# Patient Record
Sex: MALE | Race: WHITE | NOT HISPANIC OR LATINO
[De-identification: names, ages, dates, MRNs, and addresses within clinical notes are randomized per-mention and may not be internally consistent; named-entity substitution may affect disease eponyms.]

---

## 2017-04-21 ENCOUNTER — RECORDS - HEALTHEAST (OUTPATIENT)
Dept: ADMINISTRATIVE | Facility: OTHER | Age: 72
End: 2017-04-21

## 2017-04-27 ENCOUNTER — RECORDS - HEALTHEAST (OUTPATIENT)
Dept: ADMINISTRATIVE | Facility: OTHER | Age: 72
End: 2017-04-27

## 2017-05-01 ENCOUNTER — RECORDS - HEALTHEAST (OUTPATIENT)
Dept: ADMINISTRATIVE | Facility: OTHER | Age: 72
End: 2017-05-01

## 2017-05-02 ENCOUNTER — AMBULATORY - HEALTHEAST (OUTPATIENT)
Dept: CARDIOLOGY | Facility: CLINIC | Age: 72
End: 2017-05-02

## 2017-05-02 ASSESSMENT — MIFFLIN-ST. JEOR: SCORE: 1610.36

## 2017-05-05 ENCOUNTER — RECORDS - HEALTHEAST (OUTPATIENT)
Dept: ADMINISTRATIVE | Facility: OTHER | Age: 72
End: 2017-05-05

## 2017-05-10 ENCOUNTER — OFFICE VISIT - HEALTHEAST (OUTPATIENT)
Dept: INTERNAL MEDICINE | Facility: CLINIC | Age: 72
End: 2017-05-10

## 2017-05-10 ENCOUNTER — COMMUNICATION - HEALTHEAST (OUTPATIENT)
Dept: FAMILY MEDICINE | Facility: CLINIC | Age: 72
End: 2017-05-10

## 2017-05-10 DIAGNOSIS — N20.0 NEPHROLITHIASIS: ICD-10-CM

## 2017-05-10 DIAGNOSIS — Z01.818 PRE-OP EXAMINATION: ICD-10-CM

## 2017-05-10 LAB
ATRIAL RATE - MUSE: 87 BPM
DIASTOLIC BLOOD PRESSURE - MUSE: NORMAL MMHG
INTERPRETATION ECG - MUSE: NORMAL
P AXIS - MUSE: 69 DEGREES
PR INTERVAL - MUSE: 140 MS
QRS DURATION - MUSE: 92 MS
QT - MUSE: 370 MS
QTC - MUSE: 445 MS
R AXIS - MUSE: 10 DEGREES
SYSTOLIC BLOOD PRESSURE - MUSE: NORMAL MMHG
T AXIS - MUSE: 28 DEGREES
VENTRICULAR RATE- MUSE: 87 BPM

## 2017-05-10 ASSESSMENT — MIFFLIN-ST. JEOR: SCORE: 1601.74

## 2017-05-11 ENCOUNTER — AMBULATORY - HEALTHEAST (OUTPATIENT)
Dept: CARDIOLOGY | Facility: CLINIC | Age: 72
End: 2017-05-11

## 2017-05-11 ENCOUNTER — ANESTHESIA - HEALTHEAST (OUTPATIENT)
Dept: SURGERY | Facility: CLINIC | Age: 72
End: 2017-05-11

## 2017-05-11 ENCOUNTER — SURGERY - HEALTHEAST (OUTPATIENT)
Dept: SURGERY | Facility: CLINIC | Age: 72
End: 2017-05-11

## 2017-05-11 ASSESSMENT — MIFFLIN-ST. JEOR: SCORE: 1583.14

## 2017-05-30 ENCOUNTER — COMMUNICATION - HEALTHEAST (OUTPATIENT)
Dept: CARDIOLOGY | Facility: CLINIC | Age: 72
End: 2017-05-30

## 2017-05-31 ENCOUNTER — RECORDS - HEALTHEAST (OUTPATIENT)
Dept: ADMINISTRATIVE | Facility: OTHER | Age: 72
End: 2017-05-31

## 2017-07-19 ENCOUNTER — AMBULATORY - HEALTHEAST (OUTPATIENT)
Dept: CARDIOLOGY | Facility: CLINIC | Age: 72
End: 2017-07-19

## 2017-07-19 DIAGNOSIS — I10 HTN (HYPERTENSION): ICD-10-CM

## 2017-07-19 DIAGNOSIS — I25.10 CAD (CORONARY ARTERY DISEASE): ICD-10-CM

## 2017-08-09 ENCOUNTER — AMBULATORY - HEALTHEAST (OUTPATIENT)
Dept: CARDIOLOGY | Facility: CLINIC | Age: 72
End: 2017-08-09

## 2017-08-09 ASSESSMENT — MIFFLIN-ST. JEOR: SCORE: 1619.43

## 2017-08-15 ENCOUNTER — RECORDS - HEALTHEAST (OUTPATIENT)
Dept: ADMINISTRATIVE | Facility: OTHER | Age: 72
End: 2017-08-15

## 2017-08-23 ENCOUNTER — AMBULATORY - HEALTHEAST (OUTPATIENT)
Dept: CARDIOLOGY | Facility: CLINIC | Age: 72
End: 2017-08-23

## 2017-08-23 DIAGNOSIS — I25.10 CAD (CORONARY ARTERY DISEASE): ICD-10-CM

## 2017-08-23 DIAGNOSIS — I10 HTN (HYPERTENSION): ICD-10-CM

## 2017-08-23 LAB
CHOLEST SERPL-MCNC: 124 MG/DL
FASTING STATUS PATIENT QL REPORTED: NO
HDLC SERPL-MCNC: 41 MG/DL
LDLC SERPL CALC-MCNC: 66 MG/DL
TRIGL SERPL-MCNC: 87 MG/DL

## 2017-08-25 ENCOUNTER — AMBULATORY - HEALTHEAST (OUTPATIENT)
Dept: CARDIOLOGY | Facility: CLINIC | Age: 72
End: 2017-08-25

## 2017-08-25 ENCOUNTER — RECORDS - HEALTHEAST (OUTPATIENT)
Dept: ADMINISTRATIVE | Facility: OTHER | Age: 72
End: 2017-08-25

## 2017-08-30 ENCOUNTER — OFFICE VISIT - HEALTHEAST (OUTPATIENT)
Dept: CARDIOLOGY | Facility: CLINIC | Age: 72
End: 2017-08-30

## 2017-08-30 DIAGNOSIS — I25.10 CORONARY ARTERY DISEASE DUE TO LIPID RICH PLAQUE: ICD-10-CM

## 2017-08-30 DIAGNOSIS — E78.5 DYSLIPIDEMIA, GOAL LDL BELOW 70: ICD-10-CM

## 2017-08-30 DIAGNOSIS — I25.83 CORONARY ARTERY DISEASE DUE TO LIPID RICH PLAQUE: ICD-10-CM

## 2017-08-30 ASSESSMENT — MIFFLIN-ST. JEOR: SCORE: 1617.61

## 2017-10-16 ENCOUNTER — RECORDS - HEALTHEAST (OUTPATIENT)
Dept: ADMINISTRATIVE | Facility: OTHER | Age: 72
End: 2017-10-16

## 2017-10-18 ENCOUNTER — AMBULATORY - HEALTHEAST (OUTPATIENT)
Dept: CARDIOLOGY | Facility: CLINIC | Age: 72
End: 2017-10-18

## 2017-10-18 ASSESSMENT — MIFFLIN-ST. JEOR: SCORE: 1628.5

## 2017-10-19 ENCOUNTER — RECORDS - HEALTHEAST (OUTPATIENT)
Dept: ADMINISTRATIVE | Facility: OTHER | Age: 72
End: 2017-10-19

## 2017-10-23 ENCOUNTER — RECORDS - HEALTHEAST (OUTPATIENT)
Dept: ADMINISTRATIVE | Facility: OTHER | Age: 72
End: 2017-10-23

## 2018-04-20 ENCOUNTER — RECORDS - HEALTHEAST (OUTPATIENT)
Dept: ADMINISTRATIVE | Facility: OTHER | Age: 73
End: 2018-04-20

## 2018-04-23 ENCOUNTER — RECORDS - HEALTHEAST (OUTPATIENT)
Dept: ADMINISTRATIVE | Facility: OTHER | Age: 73
End: 2018-04-23

## 2018-04-27 ENCOUNTER — RECORDS - HEALTHEAST (OUTPATIENT)
Dept: ADMINISTRATIVE | Facility: OTHER | Age: 73
End: 2018-04-27

## 2018-05-03 ENCOUNTER — AMBULATORY - HEALTHEAST (OUTPATIENT)
Dept: CARDIOLOGY | Facility: CLINIC | Age: 73
End: 2018-05-03

## 2018-05-03 ASSESSMENT — MIFFLIN-ST. JEOR: SCORE: 1637.57

## 2018-05-15 ENCOUNTER — AMBULATORY - HEALTHEAST (OUTPATIENT)
Dept: FAMILY MEDICINE | Facility: CLINIC | Age: 73
End: 2018-05-15

## 2018-05-15 DIAGNOSIS — E11.9 DM II (DIABETES MELLITUS, TYPE II), CONTROLLED (H): ICD-10-CM

## 2018-05-15 DIAGNOSIS — E78.5 DYSLIPIDEMIA, GOAL LDL BELOW 70: ICD-10-CM

## 2018-05-15 DIAGNOSIS — I10 ESSENTIAL HYPERTENSION: ICD-10-CM

## 2018-05-16 ENCOUNTER — AMBULATORY - HEALTHEAST (OUTPATIENT)
Dept: LAB | Facility: CLINIC | Age: 73
End: 2018-05-16

## 2018-05-16 DIAGNOSIS — E11.9 DM II (DIABETES MELLITUS, TYPE II), CONTROLLED (H): ICD-10-CM

## 2018-05-16 DIAGNOSIS — E78.5 DYSLIPIDEMIA, GOAL LDL BELOW 70: ICD-10-CM

## 2018-05-16 DIAGNOSIS — I10 ESSENTIAL HYPERTENSION: ICD-10-CM

## 2018-05-16 LAB
ALBUMIN SERPL-MCNC: 3.7 G/DL (ref 3.5–5)
ALP SERPL-CCNC: 73 U/L (ref 45–120)
ALT SERPL W P-5'-P-CCNC: 18 U/L (ref 0–45)
ANION GAP SERPL CALCULATED.3IONS-SCNC: 10 MMOL/L (ref 5–18)
AST SERPL W P-5'-P-CCNC: 15 U/L (ref 0–40)
BILIRUB SERPL-MCNC: 0.7 MG/DL (ref 0–1)
BUN SERPL-MCNC: 14 MG/DL (ref 8–28)
CALCIUM SERPL-MCNC: 9.4 MG/DL (ref 8.5–10.5)
CHLORIDE BLD-SCNC: 108 MMOL/L (ref 98–107)
CHOLEST SERPL-MCNC: 145 MG/DL
CO2 SERPL-SCNC: 24 MMOL/L (ref 22–31)
CREAT SERPL-MCNC: 0.81 MG/DL (ref 0.7–1.3)
CREAT UR-MCNC: 151 MG/DL
FASTING STATUS PATIENT QL REPORTED: YES
GFR SERPL CREATININE-BSD FRML MDRD: >60 ML/MIN/1.73M2
GLUCOSE BLD-MCNC: 95 MG/DL (ref 70–125)
HBA1C MFR BLD: 5.5 % (ref 3.5–6)
HDLC SERPL-MCNC: 41 MG/DL
LDLC SERPL CALC-MCNC: 83 MG/DL
MICROALBUMIN UR-MCNC: 1.42 MG/DL (ref 0–1.99)
MICROALBUMIN/CREAT UR: 9.4 MG/G
POTASSIUM BLD-SCNC: 4.5 MMOL/L (ref 3.5–5)
PROT SERPL-MCNC: 6.5 G/DL (ref 6–8)
SODIUM SERPL-SCNC: 142 MMOL/L (ref 136–145)
TRIGL SERPL-MCNC: 103 MG/DL

## 2018-05-17 ENCOUNTER — COMMUNICATION - HEALTHEAST (OUTPATIENT)
Dept: FAMILY MEDICINE | Facility: CLINIC | Age: 73
End: 2018-05-17

## 2018-05-17 ENCOUNTER — OFFICE VISIT - HEALTHEAST (OUTPATIENT)
Dept: FAMILY MEDICINE | Facility: CLINIC | Age: 73
End: 2018-05-17

## 2018-05-17 ENCOUNTER — COMMUNICATION - HEALTHEAST (OUTPATIENT)
Dept: CARDIOLOGY | Facility: CLINIC | Age: 73
End: 2018-05-17

## 2018-05-17 DIAGNOSIS — I25.10 CORONARY ARTERY DISEASE DUE TO LIPID RICH PLAQUE: ICD-10-CM

## 2018-05-17 DIAGNOSIS — I25.83 CORONARY ARTERY DISEASE DUE TO LIPID RICH PLAQUE: ICD-10-CM

## 2018-05-17 DIAGNOSIS — E78.5 DYSLIPIDEMIA, GOAL LDL BELOW 70: ICD-10-CM

## 2018-05-17 DIAGNOSIS — Z85.46 H/O PROSTATE CANCER: ICD-10-CM

## 2018-05-17 DIAGNOSIS — E11.9 DM II (DIABETES MELLITUS, TYPE II), CONTROLLED (H): ICD-10-CM

## 2018-05-17 ASSESSMENT — MIFFLIN-ST. JEOR: SCORE: 1613.3

## 2018-05-22 ENCOUNTER — COMMUNICATION - HEALTHEAST (OUTPATIENT)
Dept: FAMILY MEDICINE | Facility: CLINIC | Age: 73
End: 2018-05-22

## 2018-05-24 ENCOUNTER — AMBULATORY - HEALTHEAST (OUTPATIENT)
Dept: CARDIOLOGY | Facility: CLINIC | Age: 73
End: 2018-05-24

## 2018-07-18 ENCOUNTER — AMBULATORY - HEALTHEAST (OUTPATIENT)
Dept: CARDIOLOGY | Facility: CLINIC | Age: 73
End: 2018-07-18

## 2018-07-18 ENCOUNTER — RECORDS - HEALTHEAST (OUTPATIENT)
Dept: ADMINISTRATIVE | Facility: OTHER | Age: 73
End: 2018-07-18

## 2018-07-23 ENCOUNTER — COMMUNICATION - HEALTHEAST (OUTPATIENT)
Dept: CARDIOLOGY | Facility: CLINIC | Age: 73
End: 2018-07-23

## 2018-07-23 ENCOUNTER — OFFICE VISIT - HEALTHEAST (OUTPATIENT)
Dept: CARDIOLOGY | Facility: CLINIC | Age: 73
End: 2018-07-23

## 2018-07-23 DIAGNOSIS — I25.83 CORONARY ARTERY DISEASE DUE TO LIPID RICH PLAQUE: ICD-10-CM

## 2018-07-23 DIAGNOSIS — I25.10 CORONARY ARTERY DISEASE DUE TO LIPID RICH PLAQUE: ICD-10-CM

## 2018-07-23 DIAGNOSIS — E78.5 DYSLIPIDEMIA, GOAL LDL BELOW 70: ICD-10-CM

## 2018-07-23 ASSESSMENT — MIFFLIN-ST. JEOR: SCORE: 1614.89

## 2018-07-24 ENCOUNTER — COMMUNICATION - HEALTHEAST (OUTPATIENT)
Dept: CARDIOLOGY | Facility: CLINIC | Age: 73
End: 2018-07-24

## 2018-07-24 DIAGNOSIS — E78.5 DYSLIPIDEMIA, GOAL LDL BELOW 70: ICD-10-CM

## 2018-08-02 ENCOUNTER — AMBULATORY - HEALTHEAST (OUTPATIENT)
Dept: CARDIOLOGY | Facility: CLINIC | Age: 73
End: 2018-08-02

## 2018-10-01 ENCOUNTER — RECORDS - HEALTHEAST (OUTPATIENT)
Dept: ADMINISTRATIVE | Facility: OTHER | Age: 73
End: 2018-10-01

## 2018-10-04 ENCOUNTER — RECORDS - HEALTHEAST (OUTPATIENT)
Dept: ADMINISTRATIVE | Facility: OTHER | Age: 73
End: 2018-10-04

## 2018-10-05 ENCOUNTER — RECORDS - HEALTHEAST (OUTPATIENT)
Dept: ADMINISTRATIVE | Facility: OTHER | Age: 73
End: 2018-10-05

## 2018-10-10 ENCOUNTER — AMBULATORY - HEALTHEAST (OUTPATIENT)
Dept: CARDIOLOGY | Facility: CLINIC | Age: 73
End: 2018-10-10

## 2018-10-22 ENCOUNTER — AMBULATORY - HEALTHEAST (OUTPATIENT)
Dept: CARDIOLOGY | Facility: CLINIC | Age: 73
End: 2018-10-22

## 2018-11-01 ENCOUNTER — AMBULATORY - HEALTHEAST (OUTPATIENT)
Dept: CARDIOLOGY | Facility: CLINIC | Age: 73
End: 2018-11-01

## 2019-04-03 ENCOUNTER — RECORDS - HEALTHEAST (OUTPATIENT)
Dept: ADMINISTRATIVE | Facility: OTHER | Age: 74
End: 2019-04-03

## 2019-04-25 ENCOUNTER — RECORDS - HEALTHEAST (OUTPATIENT)
Dept: ADMINISTRATIVE | Facility: OTHER | Age: 74
End: 2019-04-25

## 2019-05-10 ENCOUNTER — RECORDS - HEALTHEAST (OUTPATIENT)
Dept: ADMINISTRATIVE | Facility: OTHER | Age: 74
End: 2019-05-10

## 2019-06-18 ENCOUNTER — COMMUNICATION - HEALTHEAST (OUTPATIENT)
Dept: ADMINISTRATIVE | Facility: CLINIC | Age: 74
End: 2019-06-18

## 2019-07-31 ENCOUNTER — OFFICE VISIT - HEALTHEAST (OUTPATIENT)
Dept: FAMILY MEDICINE | Facility: CLINIC | Age: 74
End: 2019-07-31

## 2019-07-31 DIAGNOSIS — F32.9 CURRENT EPISODE OF MAJOR DEPRESSIVE DISORDER WITHOUT PRIOR EPISODE, UNSPECIFIED DEPRESSION EPISODE SEVERITY: ICD-10-CM

## 2019-07-31 DIAGNOSIS — E78.5 DYSLIPIDEMIA, GOAL LDL BELOW 70: ICD-10-CM

## 2019-07-31 DIAGNOSIS — I10 ESSENTIAL HYPERTENSION: ICD-10-CM

## 2019-07-31 DIAGNOSIS — E11.9 DIABETES MELLITUS (H): ICD-10-CM

## 2019-07-31 LAB
CHOLEST SERPL-MCNC: 146 MG/DL
CREAT UR-MCNC: 170 MG/DL
FASTING STATUS PATIENT QL REPORTED: YES
HBA1C MFR BLD: 5.3 % (ref 3.5–6)
HDLC SERPL-MCNC: 48 MG/DL
LDLC SERPL CALC-MCNC: 79 MG/DL
MICROALBUMIN UR-MCNC: 1.46 MG/DL (ref 0–1.99)
MICROALBUMIN/CREAT UR: 8.6 MG/G
TRIGL SERPL-MCNC: 97 MG/DL

## 2019-07-31 RX ORDER — SERTRALINE HYDROCHLORIDE 25 MG/1
25 TABLET, FILM COATED ORAL DAILY
Qty: 30 TABLET | Refills: 2 | Status: SHIPPED
Start: 2019-07-31

## 2019-07-31 ASSESSMENT — MIFFLIN-ST. JEOR: SCORE: 1601.28

## 2019-08-02 ENCOUNTER — COMMUNICATION - HEALTHEAST (OUTPATIENT)
Dept: FAMILY MEDICINE | Facility: CLINIC | Age: 74
End: 2019-08-02

## 2019-09-30 ENCOUNTER — RECORDS - HEALTHEAST (OUTPATIENT)
Dept: ADMINISTRATIVE | Facility: OTHER | Age: 74
End: 2019-09-30

## 2019-10-02 ENCOUNTER — OFFICE VISIT - HEALTHEAST (OUTPATIENT)
Dept: CARDIOLOGY | Facility: CLINIC | Age: 74
End: 2019-10-02

## 2019-10-02 DIAGNOSIS — I25.10 CORONARY ARTERY DISEASE DUE TO LIPID RICH PLAQUE: ICD-10-CM

## 2019-10-02 DIAGNOSIS — I25.83 CORONARY ARTERY DISEASE DUE TO LIPID RICH PLAQUE: ICD-10-CM

## 2019-10-02 DIAGNOSIS — E78.5 DYSLIPIDEMIA, GOAL LDL BELOW 70: ICD-10-CM

## 2019-10-02 ASSESSMENT — MIFFLIN-ST. JEOR: SCORE: 1610.36

## 2019-10-04 ENCOUNTER — RECORDS - HEALTHEAST (OUTPATIENT)
Dept: ADMINISTRATIVE | Facility: OTHER | Age: 74
End: 2019-10-04

## 2019-10-07 ENCOUNTER — COMMUNICATION - HEALTHEAST (OUTPATIENT)
Dept: CARDIOLOGY | Facility: CLINIC | Age: 74
End: 2019-10-07

## 2019-10-07 ENCOUNTER — RECORDS - HEALTHEAST (OUTPATIENT)
Dept: ADMINISTRATIVE | Facility: OTHER | Age: 74
End: 2019-10-07

## 2019-10-07 DIAGNOSIS — E78.5 DYSLIPIDEMIA, GOAL LDL BELOW 70: ICD-10-CM

## 2019-10-09 ENCOUNTER — COMMUNICATION - HEALTHEAST (OUTPATIENT)
Dept: CARDIOLOGY | Facility: CLINIC | Age: 74
End: 2019-10-09

## 2019-10-10 ENCOUNTER — COMMUNICATION - HEALTHEAST (OUTPATIENT)
Dept: CARDIOLOGY | Facility: CLINIC | Age: 74
End: 2019-10-10

## 2019-10-10 DIAGNOSIS — E78.5 DYSLIPIDEMIA, GOAL LDL BELOW 70: ICD-10-CM

## 2019-10-10 RX ORDER — ROSUVASTATIN CALCIUM 40 MG/1
40 TABLET, COATED ORAL DAILY
Qty: 90 TABLET | Refills: 0 | Status: SHIPPED | OUTPATIENT
Start: 2019-10-10

## 2019-12-23 ENCOUNTER — RECORDS - HEALTHEAST (OUTPATIENT)
Dept: ADMINISTRATIVE | Facility: OTHER | Age: 74
End: 2019-12-23

## 2020-01-03 ENCOUNTER — COMMUNICATION - HEALTHEAST (OUTPATIENT)
Dept: FAMILY MEDICINE | Facility: CLINIC | Age: 75
End: 2020-01-03

## 2020-01-09 ENCOUNTER — OFFICE VISIT - HEALTHEAST (OUTPATIENT)
Dept: FAMILY MEDICINE | Facility: CLINIC | Age: 75
End: 2020-01-09

## 2020-01-09 DIAGNOSIS — Z01.818 PRE-OP EXAM: ICD-10-CM

## 2020-01-09 DIAGNOSIS — E78.5 DYSLIPIDEMIA, GOAL LDL BELOW 70: ICD-10-CM

## 2020-01-09 LAB
ANION GAP SERPL CALCULATED.3IONS-SCNC: 11 MMOL/L (ref 5–18)
BASOPHILS # BLD AUTO: 0.1 THOU/UL (ref 0–0.2)
BASOPHILS NFR BLD AUTO: 1 % (ref 0–2)
BUN SERPL-MCNC: 17 MG/DL (ref 8–28)
CALCIUM SERPL-MCNC: 9.8 MG/DL (ref 8.5–10.5)
CHLORIDE BLD-SCNC: 106 MMOL/L (ref 98–107)
CHOLEST SERPL-MCNC: 125 MG/DL
CO2 SERPL-SCNC: 26 MMOL/L (ref 22–31)
CREAT SERPL-MCNC: 1.11 MG/DL (ref 0.7–1.3)
EOSINOPHIL # BLD AUTO: 0.2 THOU/UL (ref 0–0.4)
EOSINOPHIL NFR BLD AUTO: 3 % (ref 0–6)
ERYTHROCYTE [DISTWIDTH] IN BLOOD BY AUTOMATED COUNT: 13 % (ref 11–14.5)
FASTING STATUS PATIENT QL REPORTED: NO
GFR SERPL CREATININE-BSD FRML MDRD: >60 ML/MIN/1.73M2
GLUCOSE BLD-MCNC: 114 MG/DL (ref 70–125)
HCT VFR BLD AUTO: 39 % (ref 40–54)
HDLC SERPL-MCNC: 44 MG/DL
HGB BLD-MCNC: 13.3 G/DL (ref 14–18)
LDLC SERPL CALC-MCNC: 48 MG/DL
LYMPHOCYTES # BLD AUTO: 2 THOU/UL (ref 0.8–4.4)
LYMPHOCYTES NFR BLD AUTO: 30 % (ref 20–40)
MCH RBC QN AUTO: 29 PG (ref 27–34)
MCHC RBC AUTO-ENTMCNC: 34 G/DL (ref 32–36)
MCV RBC AUTO: 85 FL (ref 80–100)
MONOCYTES # BLD AUTO: 0.6 THOU/UL (ref 0–0.9)
MONOCYTES NFR BLD AUTO: 10 % (ref 2–10)
NEUTROPHILS # BLD AUTO: 3.8 THOU/UL (ref 2–7.7)
NEUTROPHILS NFR BLD AUTO: 57 % (ref 50–70)
PLATELET # BLD AUTO: 240 THOU/UL (ref 140–440)
PMV BLD AUTO: 6.7 FL (ref 7–10)
POTASSIUM BLD-SCNC: 4.1 MMOL/L (ref 3.5–5)
RBC # BLD AUTO: 4.58 MILL/UL (ref 4.4–6.2)
SODIUM SERPL-SCNC: 143 MMOL/L (ref 136–145)
TRIGL SERPL-MCNC: 166 MG/DL
WBC: 6.6 THOU/UL (ref 4–11)

## 2020-01-09 ASSESSMENT — MIFFLIN-ST. JEOR: SCORE: 1612.62

## 2020-01-10 ENCOUNTER — COMMUNICATION - HEALTHEAST (OUTPATIENT)
Dept: FAMILY MEDICINE | Facility: CLINIC | Age: 75
End: 2020-01-10

## 2020-01-10 LAB
ATRIAL RATE - MUSE: 67 BPM
DIASTOLIC BLOOD PRESSURE - MUSE: NORMAL
INTERPRETATION ECG - MUSE: NORMAL
P AXIS - MUSE: 69 DEGREES
PR INTERVAL - MUSE: 174 MS
QRS DURATION - MUSE: 92 MS
QT - MUSE: 398 MS
QTC - MUSE: 420 MS
R AXIS - MUSE: 16 DEGREES
SYSTOLIC BLOOD PRESSURE - MUSE: NORMAL
T AXIS - MUSE: 34 DEGREES
VENTRICULAR RATE- MUSE: 67 BPM

## 2020-01-10 ASSESSMENT — MIFFLIN-ST. JEOR: SCORE: 1610.36

## 2020-01-13 ENCOUNTER — SURGERY - HEALTHEAST (OUTPATIENT)
Dept: SURGERY | Facility: HOSPITAL | Age: 75
End: 2020-01-13

## 2020-01-13 ENCOUNTER — ANESTHESIA - HEALTHEAST (OUTPATIENT)
Dept: SURGERY | Facility: HOSPITAL | Age: 75
End: 2020-01-13

## 2020-05-19 ENCOUNTER — COMMUNICATION - HEALTHEAST (OUTPATIENT)
Dept: FAMILY MEDICINE | Facility: CLINIC | Age: 75
End: 2020-05-19

## 2020-07-22 ENCOUNTER — RECORDS - HEALTHEAST (OUTPATIENT)
Dept: ADMINISTRATIVE | Facility: OTHER | Age: 75
End: 2020-07-22

## 2020-08-06 ENCOUNTER — COMMUNICATION - HEALTHEAST (OUTPATIENT)
Dept: CARDIOLOGY | Facility: CLINIC | Age: 75
End: 2020-08-06

## 2020-08-07 ENCOUNTER — OFFICE VISIT - HEALTHEAST (OUTPATIENT)
Dept: CARDIOLOGY | Facility: CLINIC | Age: 75
End: 2020-08-07

## 2020-08-07 DIAGNOSIS — E11.9 DM II (DIABETES MELLITUS, TYPE II), CONTROLLED (H): ICD-10-CM

## 2020-08-07 DIAGNOSIS — I25.10 CORONARY ARTERY DISEASE DUE TO LIPID RICH PLAQUE: ICD-10-CM

## 2020-08-07 DIAGNOSIS — E78.5 DYSLIPIDEMIA, GOAL LDL BELOW 70: ICD-10-CM

## 2020-08-07 DIAGNOSIS — I25.83 CORONARY ARTERY DISEASE DUE TO LIPID RICH PLAQUE: ICD-10-CM

## 2020-08-07 LAB
ALBUMIN SERPL-MCNC: 4 G/DL (ref 3.5–5)
ANION GAP SERPL CALCULATED.3IONS-SCNC: 9 MMOL/L (ref 5–18)
BUN SERPL-MCNC: 17 MG/DL (ref 8–28)
CALCIUM SERPL-MCNC: 9.2 MG/DL (ref 8.5–10.5)
CHLORIDE BLD-SCNC: 109 MMOL/L (ref 98–107)
CHOLEST SERPL-MCNC: 135 MG/DL
CO2 SERPL-SCNC: 26 MMOL/L (ref 22–31)
CREAT SERPL-MCNC: 1.29 MG/DL (ref 0.7–1.3)
FASTING STATUS PATIENT QL REPORTED: YES
GFR SERPL CREATININE-BSD FRML MDRD: 55 ML/MIN/1.73M2
GLUCOSE BLD-MCNC: 99 MG/DL (ref 70–125)
HBA1C MFR BLD: 5.8 %
HDLC SERPL-MCNC: 44 MG/DL
LDLC SERPL CALC-MCNC: 57 MG/DL
PHOSPHATE SERPL-MCNC: 2.6 MG/DL (ref 2.5–4.5)
POTASSIUM BLD-SCNC: 4.6 MMOL/L (ref 3.5–5)
SODIUM SERPL-SCNC: 144 MMOL/L (ref 136–145)
TRIGL SERPL-MCNC: 170 MG/DL

## 2020-08-07 ASSESSMENT — MIFFLIN-ST. JEOR: SCORE: 1619.43

## 2021-05-28 ENCOUNTER — RECORDS - HEALTHEAST (OUTPATIENT)
Dept: ADMINISTRATIVE | Facility: CLINIC | Age: 76
End: 2021-05-28

## 2021-05-30 VITALS — HEIGHT: 71 IN | WEIGHT: 185.1 LBS | BODY MASS INDEX: 25.91 KG/M2

## 2021-05-30 VITALS — BODY MASS INDEX: 25.34 KG/M2 | HEIGHT: 71 IN | WEIGHT: 181 LBS

## 2021-05-30 VITALS — HEIGHT: 71 IN | WEIGHT: 187 LBS | BODY MASS INDEX: 26.18 KG/M2

## 2021-05-31 VITALS — HEIGHT: 71 IN | WEIGHT: 189 LBS | BODY MASS INDEX: 26.46 KG/M2

## 2021-05-31 VITALS — HEIGHT: 71 IN | WEIGHT: 191 LBS | BODY MASS INDEX: 26.74 KG/M2

## 2021-05-31 VITALS — HEIGHT: 71 IN | WEIGHT: 188.6 LBS | BODY MASS INDEX: 26.4 KG/M2

## 2021-05-31 NOTE — PROGRESS NOTES
DIAGNOSIS:  1. Diabetes mellitus (H)  Glycosylated Hemoglobin A1c                            The diagnosis was confirmed.  The condition is stable/controlled on METFORMIN and no side effects  have been noted.  The appropriate follow up labs  ( A1C )have been ordered  (OR ARE UP TO DATE) and medication refills ordered if requested.   Lipid Cascade FASTING    Microalbumin, Random Urine   2. Current episode of major depressive disorder without prior episode, unspecified depression episode severity                         The diagnosis was confirmed.  The condition is stable/controlled on SERTRALINE and no side effects  have been noted.  The appropriate follow up labs  ( NA )have been ordered  (OR ARE UP TO DATE) and medication refills ordered if requested.    sertraline (ZOLOFT) 25 MG tablet   3. Essential hypertension                          The diagnosis was confirmed.  The condition is stable/controlled on LISINOPRIL and no side effects  have been noted.  The appropriate follow up labs  ( UTD )have been ordered  (OR ARE UP TO DATE) and medication refills ordered if requested.      4. Dyslipidemia, goal LDL below 70                          The diagnosis was confirmed.  The condition is stable/controlled on ATORVASTATIN and no side effects  have been noted.  The appropriate follow up labs  ( FLP )have been ordered  (OR ARE UP TO DATE) and medication refills ordered if requested.          PLAN:     Change lisinopril to morning dosing    Check home BP checks    Follow up BP check when you see Dr Smith in Sept 2019            HPI:     In [place] had back problems on 3 April 2019.  If sleep on his left side then gets pain in the LUE.  No weakness or paresthesias in the UE.   [facility]    Saw Urology in May 2019 and PSA was <0.1    BP as low as 117/67 and last night 145/84    No muscle aches on the atorvastatin  No cough on the lisinopril  His wife feels like he is doing better on  "sertraline.      Current Outpatient Medications on File Prior to Visit   Medication Sig Dispense Refill     aspirin 81 MG EC tablet Take 81 mg by mouth daily.       atorvastatin (LIPITOR) 80 MG tablet Take 1 tablet (80 mg total) by mouth daily. 90 tablet 3     FOLIC ACID/MULTIVITS-MIN/LUT (CENTRUM SILVER ORAL) Take 1 tablet by mouth daily.       lisinopril (PRINIVIL,ZESTRIL) 20 MG tablet Take 20 mg by mouth every evening.        metFORMIN (GLUCOPHAGE) 500 MG tablet Take 500 mg by mouth 2 (two) times a day with meals.       nitroglycerin (NITROSTAT) 0.4 MG SL tablet Place 0.4 mg under the tongue every 5 (five) minutes as needed for chest pain.       sildenafil (REVATIO) 20 mg tablet 25 mg daily.  11     [DISCONTINUED] sildenafil (VIAGRA) 100 MG tablet Taking every other day 60 tablet 0     Current Facility-Administered Medications on File Prior to Visit   Medication Dose Route Frequency Provider Last Rate Last Dose     Study Drug Ticagrelor 60 mg/Placebo <THEMIS> tablet 60 mg  1 tablet Oral BID Rebeca Cheng MD         Study Drug Ticagrelor 60 mg/Placebo <THEMIS> tablet 60 mg  1 tablet Oral BID Rebeca Cheng MD         Study Drug Ticagrelor 60 mg/Placebo <THEMIS> tablet 60 mg  1 tablet Oral BID Rebeca Cheng MD         Study Drug Ticagrelor 60 mg/Placebo <THEMIS> tablet 60 mg  1 tablet Oral BID Rebeca Cheng MD         Study Drug Ticagrelor 60 mg/Placebo <THEMIS> tablet 60 mg  1 tablet Oral BID Rebeca Cheng MD         Study Drug Ticagrelor 60 mg/Placebo <THEMIS> tablet 60 mg  1 tablet Oral BID Rebeca Cheng MD         Study Drug Ticagrelor 60 mg/Placebo <THEMIS> tablet 60 mg  1 tablet Oral BID Rebeca Cheng MD           Pmh: reviewed  Psh: reviewed  Allergy:  reviewed      EXAM:    BP (!) 164/97   Pulse 74   Ht 5' 11\" (1.803 m)   Wt 185 lb (83.9 kg)   SpO2 96%   BMI 25.80 kg/m    GEN:   ALERT, NAD, ORIENTED TIMES THREE  LUNGS: CTA  COR: RRR WITHOUT MURMUR  SKIN: NO RASH , " ULCERS OR LESIONS NOTED  EXT: WITHOUT EDEMA/SWELLING  FEET:  MF TESTING: NL              SKIN EXAM:  NL              VASCULAR:   R DP  PULSE: -                                      L DP  PULSE: -                                      R PT  PULSE: +                                      L PT  PULSE: +      Recent Results (from the past 168 hour(s))   Glycosylated Hemoglobin A1c    Collection Time: 07/31/19 10:55 AM   Result Value Ref Range    Hemoglobin A1c 5.3 3.5 - 6.0 %     Lab Results   Component Value Date    CHOL 145 05/16/2018    CHOL 124 08/23/2017    CHOL 160 06/07/2016     Lab Results   Component Value Date    HDL 41 05/16/2018    HDL 41 08/23/2017    HDL 47 06/07/2016     Lab Results   Component Value Date    LDLCALC 83 05/16/2018    LDLCALC 66 08/23/2017    LDLCALC 84 06/07/2016     Lab Results   Component Value Date    TRIG 103 05/16/2018    TRIG 87 08/23/2017    TRIG 147 06/07/2016     Lab Results   Component Value Date    MICROALBUR 1.42 05/16/2018   \    No components found for: CHOLHDL

## 2021-05-31 NOTE — PATIENT INSTRUCTIONS - HE
Change lisinopril to morning dosing    Check home BP checks    Follow up BP check when you see Dr Smith in Sept 2019

## 2021-06-01 VITALS — BODY MASS INDEX: 26.32 KG/M2 | HEIGHT: 71 IN | WEIGHT: 188 LBS

## 2021-06-01 VITALS — BODY MASS INDEX: 26.52 KG/M2 | WEIGHT: 189.4 LBS | HEIGHT: 71 IN

## 2021-06-01 VITALS — WEIGHT: 193 LBS | BODY MASS INDEX: 27.02 KG/M2 | HEIGHT: 71 IN

## 2021-06-01 NOTE — PATIENT INSTRUCTIONS - HE
Aguilar,    It was a pleasure to see you today at the [facility].     Please switch from atorvastatin to rosuvastatin 40 mg orally daily. We will recheck your lipid profile next April.    If the exertional chest discomfort becomes more oppressive, please call us to set up a stress test.    You will see Dr. Smith in one year.     Please call us if you have any questions or concerns about your heart.      Jose Smith M.D.

## 2021-06-02 NOTE — TELEPHONE ENCOUNTER
----- Message from Leanne Richards RN sent at 10/7/2019 11:27 AM CDT -----  Regarding: FW: [facility]    ----- Message -----  From: Jarad Stephenson  Sent: 10/7/2019  10:01 AM CDT  To: Leanne Richards RN  Subject: Refill                                           Patient has already contacted their pharmacy. The medication or refill issue is below:    Primary Cardiologist: Dr Smith    Medication: rosuvastatin (CRESTOR) 40 MG tablet    Issue / Concern: refill - Pt states he needed to [facility] as [facility] for his future orders but for now he wants it at a local [facility]    Preferred Pharmacy: [facility] #[id number] - [address] Phone Number for Patient: home    Additional Info:

## 2021-06-02 NOTE — TELEPHONE ENCOUNTER
Left detailed message for patient informing him that refill nurse sent 90day supply of Crestor to local pharmacy as requested and confirm [facility] for [facility] Smith at 10-2-19 visit.  mg

## 2021-06-03 VITALS
HEART RATE: 72 BPM | BODY MASS INDEX: 26.18 KG/M2 | RESPIRATION RATE: 16 BRPM | WEIGHT: 187 LBS | SYSTOLIC BLOOD PRESSURE: 130 MMHG | DIASTOLIC BLOOD PRESSURE: 76 MMHG | HEIGHT: 71 IN

## 2021-06-03 VITALS — BODY MASS INDEX: 25.9 KG/M2 | HEIGHT: 71 IN | WEIGHT: 185 LBS

## 2021-06-04 VITALS
DIASTOLIC BLOOD PRESSURE: 82 MMHG | WEIGHT: 189 LBS | RESPIRATION RATE: 16 BRPM | BODY MASS INDEX: 26.46 KG/M2 | HEART RATE: 75 BPM | HEIGHT: 71 IN | SYSTOLIC BLOOD PRESSURE: 128 MMHG | OXYGEN SATURATION: 94 %

## 2021-06-04 VITALS — HEIGHT: 71 IN | WEIGHT: 187 LBS | BODY MASS INDEX: 26.18 KG/M2

## 2021-06-04 VITALS
DIASTOLIC BLOOD PRESSURE: 70 MMHG | BODY MASS INDEX: 26.25 KG/M2 | WEIGHT: 187.5 LBS | HEIGHT: 71 IN | SYSTOLIC BLOOD PRESSURE: 140 MMHG

## 2021-06-04 NOTE — TELEPHONE ENCOUNTER
Spoke to pt and let him know that we do not have any openings here today. Offered to check other clinics but pt declined stating that he is [facility] can get [facility]. Pt will call back if he needs to schedule with us.

## 2021-06-04 NOTE — TELEPHONE ENCOUNTER
New Appointment Needed  What is the reason for the visit:    Pre-Op Appt Request  When is the surgery? :  Likely Monday/hasn't been set yet  Where is the surgery?:   [facility]  Who is the surgeon? :  Dr. Purcell  What type of surgery is being done?: Kidney stone  Provider Preference: Any available  How soon do you need to be seen?: As soon as Possible  Waitlist offered?: No  Okay to leave a detailed message:  Yes

## 2021-06-05 NOTE — ANESTHESIA PREPROCEDURE EVALUATION
Anesthesia Evaluation      Patient summary reviewed     Airway   Mallampati: II  Neck ROM: full   Pulmonary - normal exam   (+) sleep apnea on no CPAP, ,                          Cardiovascular - normal exam  Exercise tolerance: > or = 4 METS  (+) hypertension, CAD, CABG/stent, , hypercholesterolemia, PVD     Neuro/Psych - negative ROS     Endo/Other    (+) diabetes mellitus type 2,      GI/Hepatic/Renal - negative ROS   (-) renal disease     Other findings: Pt lost 50 lbs and quit using CPAP, no sleep study repeated but wife says he no longer has the signs she had been observing. Denies MI, had 3 vessel CABG 9/2013.  50-69% stenosis of one carotid by US, plus small aneurysm, but no surgery.  Nephrolithiasis.  Prostate Ca, BCC on back.  Hg 13.3, K 4.1, GFR>60.        Dental - normal exam                        Anesthesia Plan  Planned anesthetic: general LMA    ASA 3   Induction: intravenous   Anesthetic plan and risks discussed with: patient  Anesthesia plan special considerations: antiemetics,   Post-op plan: routine recovery

## 2021-06-05 NOTE — TELEPHONE ENCOUNTER
----- Message from Kelsey Lee PA-C sent at 1/10/2020  6:46 AM CST -----  EKG read as no significant changes and labs are normal, cleared for surgery if cardiology reading is the same as this EKG initial reading. please call results to the patient or send a letter if not reachable by phone.

## 2021-06-05 NOTE — TELEPHONE ENCOUNTER
----- Message from Miky Aguayo MD sent at 1/10/2020 10:48 AM CST -----  Please let pt know that his lipid panel looked good.  ----- Message -----  From: Darrell Smith MD  Sent: 1/10/2020   8:46 AM CST  To: Miky Aguayo MD, MORENO Justice, no new orders.    Miky, I trust that your team will communicate with Aguilar as they shweta this using my order.    Thanks,    Jose

## 2021-06-05 NOTE — ANESTHESIA POSTPROCEDURE EVALUATION
Patient: Aguilar Patterson  CYSTOSCOPY LEFT URETEROSCOPY LASER LITHOTRIPSY STONE BASKET EXTRACTION, LEFT RETROGRADE PYELOGRAM LEFT STENT PLACEMENT  Anesthesia type: general    Patient location: PACU  Last vitals:   Vitals Value Taken Time   /66 1/13/2020  7:40 PM   Temp 36.3  C (97.4  F) 1/13/2020  6:57 PM   Pulse 72 1/13/2020  7:40 PM   Resp 16 1/13/2020  7:40 PM   SpO2 93 % 1/13/2020  7:40 PM     Post vital signs: stable  Level of consciousness: awake and responds to simple questions  Post-anesthesia pain: pain controlled  Post-anesthesia nausea and vomiting: no  Pulmonary: unassisted, return to baseline  Cardiovascular: stable and blood pressure at baseline  Hydration: adequate  Anesthetic events: no    QCDR Measures:  ASA# 11 - Ellen-op Cardiac Arrest: ASA11B - Patient did NOT experience unanticipated cardiac arrest  ASA# 12 - Ellen-op Mortality Rate: ASA12B - Patient did NOT die  ASA# 13 - PACU Re-Intubation Rate: ASA13B - Patient did NOT require a new airway mgmt  ASA# 10 - Composite Anes Safety: ASA10A - No serious adverse event    Additional Notes:

## 2021-06-05 NOTE — PROGRESS NOTES
Preoperative Exam    Scheduled Procedure: Kidney stones  Surgery Date:  1/13/2020  Surgery Location: [facility], fax [phone]    Surgeon:  Dr. Raymond    Assessment/Plan:     1. Pre-op exam    - Electrocardiogram Perform - Clinic  - Basic Metabolic Panel; Future  - HM1(CBC and Differential); Future  - Basic Metabolic Panel  - HM1(CBC and Differential)  - HM1 (CBC with Diff)    2. Kidney stones    3. Dyslipidemia, goal LDL below 70    - Lipid Profile     Surgical Procedure Risk: Intermediate (reported cardiac risk generally 1-5%)  Have you had prior anesthesia?: Yes  Have you or any family members had a previous anesthesia reaction:  No  Do you or any family members have a history of a clotting or bleeding disorder?: No  Cardiac Risk Assessment: increased risk for major cardiac complications based on  myocardial infarction history    Pending review of diagnostic evaluation, APPROVAL GIVEN to proceed with proposed procedure, without further diagnostic evaluation.    Please Note:  if EKG is read as unchanged by Cardiology then he is approved without cardiology consult.        Functional Status: Independent  Patient plans to recover at home with family.     Subjective:      Aguilar Patterson is a 74 y.o. male who presents for a preoperative consultation.    He has a history of triple bypass surgery, myocardial infarction in 2016.  Since that time he has not had chest pain, palpitations, shortness of breath, wheezing, claudication, lower leg edema.  His blood pressure has been well controlled by his current medications and he has recently been increased on his statin medication to achieve a LDL lower than 100.  He has regular visits with his cardiology provider, Dr. Smith.    ROS: Patient denies fever, chills, sweats, fainting, fatigue, weight change, dizziness, sleep problems, chest pain, palpitations, shortness of breath, wheezing, cough,  sore throat, changes in hearing, ear pain,tinnitus,  disphagia,  sore throat, globus, changes in vision, eye pain eye redness, acid reflux, nausea, vomiting, diarrhea, constipation, black or bloody stools,  Dysuria, frequency, urinary incontinence, nocturia, hematuria, back pain,joint pain, bone pain, muscle cramps,edema, weakness, numbness, tingling of extremities, rash, itching, skin changes, swollen lymph nodes, thirst, increased urination, breast lumps, breast pain, nipple discharge, memory difficulties, anxiety, mood swings, (female)vaginal discharge, dyspareunia, menorrhagia, pelvic pain, sexual dysfunction,   (male) testicular lumps, erectile dysfunction.    All other systems reviewed and are negative, other than those listed in the HPI.    Pertinent History  Do you have difficulty breathing or chest pain after walking up a flight of stairs: No  History of obstructive sleep apnea: Yes:   Steroid use in the last 6 months: No  Frequent Aspirin/NSAID use: Yes: Baby asprini 80 mg  Prior Blood Transfusion: Yes: By Pass Surgery   Prior Blood Transfusion Reaction: No  If for some reason prior to, during or after the procedure, if it is medically indicated, would you be willing to have a blood transfusion?:  There is no transfusion refusal.    Current Outpatient Medications   Medication Sig Dispense Refill     aspirin 81 MG EC tablet Take 81 mg by mouth daily.       FOLIC ACID/MULTIVITS-MIN/LUT (CENTRUM SILVER ORAL) Take 1 tablet by mouth daily.       lisinopril (PRINIVIL,ZESTRIL) 20 MG tablet Take 40 mg by mouth every evening. And morning       metFORMIN (GLUCOPHAGE) 500 MG tablet Take 500 mg by mouth 2 (two) times a day with meals.       rosuvastatin (CRESTOR) 40 MG tablet Take 1 tablet (40 mg total) by mouth daily. 90 tablet 0     sertraline (ZOLOFT) 25 MG tablet Take 1 tablet (25 mg total) by mouth daily. 30 tablet 2     sildenafil (REVATIO) 20 mg tablet 25 mg daily.  11     nitroglycerin (NITROSTAT) 0.4 MG SL tablet Place 0.4 mg under the tongue every 5 (five) minutes as  needed for chest pain.       Current Facility-Administered Medications   Medication Dose Route Frequency Provider Last Rate Last Dose     Study Drug Ticagrelor 60 mg/Placebo <THEMIS> tablet 60 mg  1 tablet Oral BID Rebeca Cheng MD         Study Drug Ticagrelor 60 mg/Placebo <THEMIS> tablet 60 mg  1 tablet Oral BID Rebeca Cheng MD         Study Drug Ticagrelor 60 mg/Placebo <THEMIS> tablet 60 mg  1 tablet Oral BID Rebeca Cheng MD         Study Drug Ticagrelor 60 mg/Placebo <THEMIS> tablet 60 mg  1 tablet Oral BID Rebeca Cheng MD         Study Drug Ticagrelor 60 mg/Placebo <THEMIS> tablet 60 mg  1 tablet Oral BID Rebeca Cheng MD         Study Drug Ticagrelor 60 mg/Placebo <THEMIS> tablet 60 mg  1 tablet Oral BID Rebeca Cheng MD            No Known Allergies    Patient Active Problem List   Diagnosis     Dyslipidemia, goal LDL below 70     Essential hypertension     Male Erectile Disorder     Coronary artery disease due to lipid rich plaque     DM II (diabetes mellitus, type II), controlled (H)       Past Medical History:   Diagnosis Date     Basal cell carcinoma of skin 06/10/2016    left back, removed by primary excision     Bladder stone 5/12/2015     Calculus of gallbladder with chronic cholecystitis with obstruction      Carotid Artery Stenosis     Olivier, Miky: 50-69% by US      Colon adenoma 10/8/2015    COLONOSCOPY 10-1-15 DUE IN 5 YEARS      Coronary artery disease due to lipid rich plaque 2013     Dyslipidemia, goal LDL below 70 2013     Essential hypertension      History of transfusion      Kidney stones      Obstructive Sleep Apnea     He lost 50 pounds and does not use CPAP at this time (2016)     Prostate cancer (H)      Supraclinoid carotid artery aneurysm, small      Type 2 Diabetes Mellitus      Umbilical hernia        Past Surgical History:   Procedure Laterality Date     CORONARY ARTERY BYPASS GRAFT  9/302013    LIMA to LAD, and 3 SVG's to DG, OM and RCA      CYSTOSCOPY W/ LASER LITHOTRIPSY Right 5/11/2017    Procedure: CYSTOSCOPY RIGHT URETEROSCOPIC STONE EXTRACTION  WITH LASER LITHOTRIPSY,  RIGHT URETERAL STENT PLACEMENT;  Surgeon: Rohan Guillen MD;  Location: [facility];  Service:      LAPAROSCOPIC CHOLECYSTECTOMY N/A 9/11/2016    Procedure: CHOLECYSTECTOMY LAPAROSCOPIC WITH CHOLANGIOGRAMS;  Surgeon: Efraín Johnson MD;  Location: [facility];  Service:      IN LAP,PROSTATECTOMY,RADICAL,W/NERVE SPARE,INCL ROBOTIC Bilateral 10/18/2016    Procedure: DAVINCI RADICAL RETROPUBIC PROSTATECTOMY BILATERAL PELVIC LYMPH NODE DISSECTION, REPAIR UMBILICAL HERNIA, REMOVE BLADDER CALCULUS LYSIS OF ADHESIONS;  Surgeon: Ramon Purcell MD;  Location: [facility];  Service: Urology       Social History     Socioeconomic History     Marital status:      Spouse name: Renetta     Number of children: 1     Years of education: Not on file     Highest education level: Not on file   Occupational History     Occupation:      Employer: RETIRED   Social Needs     Financial resource strain: Not on file     Food insecurity:     Worry: Not on file     Inability: Not on file     Transportation needs:     Medical: Not on file     Non-medical: Not on file   Tobacco Use     Smoking status: Never Smoker     Smokeless tobacco: Never Used   Substance and Sexual Activity     Alcohol use: No     Drug use: No     Sexual activity: Yes     Partners: Female     Birth control/protection: Post-menopausal   Lifestyle     Physical activity:     Days per week: 3 days     Minutes per session: 30 min     Stress: Not on file   Relationships     Social connections:     Talks on phone: Not on file     Gets together: Not on file     Attends Yazidi service: Not on file     Active member of club or organization: Not on file     Attends meetings of clubs or organizations: Not on file     Relationship status: Not on file     Intimate partner violence:     Fear of  "current or ex partner: Not on file     Emotionally abused: Not on file     Physically abused: Not on file     Forced sexual activity: Not on file   Other Topics Concern     Not on file   Social History Narrative    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 He gets his medications from the [facility] and sees a nurse practitioner at their [facility]. 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  He then was rehabilitated at [facility] in [place], [place].  Viktor and his wife spend 6 months each winter in [place], [place], which is a [facility] adjacent to [place].  They have 1 son and do not have any grandchildren.  Their son is a  in the [employer].  Viktor told me in 2019 that he and his wife are contemplating moving to [place] full-time.       Patient Care Team:  Miky Aguayo MD as PCP - General  Miky Aguayo MD as Assigned PCP          Objective:     Vitals:    01/09/20 1747   BP: 150/80   Weight: 187 lb 8 oz (85 kg)   Height: 5' 11\" (1.803 m)         Physical Exam:  Alert, cooperative, well-hydrated.  Appears well.  Eyes: Pupils equal, round, reactive to light.  HEENT: Sclera white, nares patent, MMM   Lungs: Clear to auscultation. No retractions, no increased work of respiration, equal chest rise.   Heart: Regular rate and rhythm, no murmurs, clicks,    Gallops.  Abdomen: Soft, bowel sounds in 4 quadrants with no tenderness to palpation, no organomegaly or masses, no aortic or renal bruits.  Extremities: no tenderness to palpation of gastrocnemius, bilaterally.  Skin: no increased warmth, edema, or erythema of lower legs bilaterally.  Back:  No cervical, thoracic or lumbar tenderness to spinous processes or musculature.    Neuro: pupils equal and reactive to light bilaterally, CN II - XII grossly intact. No focal motor/sensory deficits.      There are no Patient Instructions on file for this " visit.  Electrocardiogram Perform - Clinic   Order: [id number]   Status:  In process   Visible to patient:  No (Not Released)   Next appt:  None   Dx:  Pre-op exam     Ref Range & Units 1/9/20 1825 5/10/17 1559    SYSTOLIC BLOOD PRESSURE       DIASTOLIC BLOOD PRESSURE       VENTRICULAR RATE BPM 67  87     ATRIAL RATE BPM 67  87     P-R INTERVAL ms 174  140     QRS DURATION ms 92  92     Q-T INTERVAL ms 398  370     QTC CALCULATION (BEZET) ms 420  445     P Axis degrees 69  69     R AXIS degrees 16  10     T AXIS degrees 34  28     MUSE DIAGNOSIS  Normal sinus rhythm   Possible Left atrial enlargement   Nonspecific ST abnormality   Abnormal ECG   When compared with ECG of 10-MAY-2017 15:59,   No significant change was found  Normal sinus rhythm   Left atrial enlargement   Nonspecific ST abnormality   Abnormal ECG   When compared with ECG of 10-SEP-2016 22:17,   ST now depressed in Inferior leads   Confirmed by SEBASTIAN FOSTER, HEATH LOC:[facility] ([id number]) on 5/10/2017 4:27:18 PM    Resulting Agency  [facility]         Specimen Collected: 01/09/20 18:25   Last Resulted: 01/09/20 18:27   Lab Flowsheet Order Details View Encounter Lab and Collection Details Routing Result History         Linked Documents      View Image         Other Results from 1/9/2020     Contains abnormal data Lipid Profile   Order: [id number]     Status:  Final result   Visible to patient:  Yes (MyChart)   Next appt:  None   Dx:  Dyslipidemia, goal LDL below 70     Ref Range & Units 1/9/20 1845    Triglycerides <=149 mg/dL 166High      Cholesterol <=199 mg/dL 125     LDL Calculated <=129 mg/dL 48     HDL Cholesterol >=40 mg/dL 44     Patient Fasting > 8hrs?  No    Resulting Agency  [facility]         Specimen Collected: 01/09/20 18:45   Last Resulted: 01/09/20 22:41   Lab Flowsheet Order Details View Encounter Lab and Collection Details Routing Result History               Labs:  Electrocardiogram Perform - Clinic   Order: [id number]   Status:  Final result   Visible to  patient:  No (Not Released)   Next appt:  None   Dx:  Pre-op exam     Ref Range & Units 1/9/20 1825 5/10/17 1559    SYSTOLIC BLOOD PRESSURE       DIASTOLIC BLOOD PRESSURE       VENTRICULAR RATE BPM 67  87     ATRIAL RATE BPM 67  87     P-R INTERVAL ms 174  140     QRS DURATION ms 92  92     Q-T INTERVAL ms 398  370     QTC CALCULATION (BEZET) ms 420  445     P Axis degrees 69  69     R AXIS degrees 16  10     T AXIS degrees 34  28     MUSE DIAGNOSIS  Sinus rhythm   Possible Left atrial enlargement   Minimal criteria for Nonspecific ST abnormality   Abnormal ECG   When compared with ECG of 10-MAY-2017 15:59,   No significant change was found   Confirmed by KEVIN FUNES MD LOC:[facility] ([id number]) on 1/10/2020 11:36:19 AM              Recent Results (from the past 48 hour(s))   Electrocardiogram Perform - Clinic    Collection Time: 01/09/20  6:25 PM   Result Value Ref Range    SYSTOLIC BLOOD PRESSURE      DIASTOLIC BLOOD PRESSURE      VENTRICULAR RATE 67 BPM    ATRIAL RATE 67 BPM    P-R INTERVAL 174 ms    QRS DURATION 92 ms    Q-T INTERVAL 398 ms    QTC CALCULATION (BEZET) 420 ms    P Axis 69 degrees    R AXIS 16 degrees    T AXIS 34 degrees    MUSE DIAGNOSIS       Normal sinus rhythm  Possible Left atrial enlargement  Nonspecific ST abnormality  Abnormal ECG  When compared with ECG of 10-MAY-2017 15:59,  No significant change was found     Lipid Profile    Collection Time: 01/09/20  6:45 PM   Result Value Ref Range    Triglycerides 166 (H) <=149 mg/dL    Cholesterol 125 <=199 mg/dL    LDL Calculated 48 <=129 mg/dL    HDL Cholesterol 44 >=40 mg/dL    Patient Fasting > 8hrs? No    Basic Metabolic Panel    Collection Time: 01/09/20  6:45 PM   Result Value Ref Range    Sodium 143 136 - 145 mmol/L    Potassium 4.1 3.5 - 5.0 mmol/L    Chloride 106 98 - 107 mmol/L    CO2 26 22 - 31 mmol/L    Anion Gap, Calculation 11 5 - 18 mmol/L    Glucose 114 70 - 125 mg/dL    Calcium 9.8 8.5 - 10.5 mg/dL    BUN 17 8 - 28 mg/dL    Creatinine  1.11 0.70 - 1.30 mg/dL    GFR MDRD Af Amer >60 >60 mL/min/1.73m2    GFR MDRD Non Af Amer >60 >60 mL/min/1.73m2   HM1 (CBC with Diff)    Collection Time: 01/09/20  6:45 PM   Result Value Ref Range    WBC 6.6 4.0 - 11.0 thou/uL    RBC 4.58 4.40 - 6.20 mill/uL    Hemoglobin 13.3 (L) 14.0 - 18.0 g/dL    Hematocrit 39.0 (L) 40.0 - 54.0 %    MCV 85 80 - 100 fL    MCH 29.0 27.0 - 34.0 pg    MCHC 34.0 32.0 - 36.0 g/dL    RDW 13.0 11.0 - 14.5 %    Platelets 240 140 - 440 thou/uL    MPV 6.7 (L) 7.0 - 10.0 fL    Neutrophils % 57 50 - 70 %    Lymphocytes % 30 20 - 40 %    Monocytes % 10 2 - 10 %    Eosinophils % 3 0 - 6 %    Basophils % 1 0 - 2 %    Neutrophils Absolute 3.8 2.0 - 7.7 thou/uL    Lymphocytes Absolute 2.0 0.8 - 4.4 thou/uL    Monocytes Absolute 0.6 0.0 - 0.9 thou/uL    Eosinophils Absolute 0.2 0.0 - 0.4 thou/uL    Basophils Absolute 0.1 0.0 - 0.2 thou/uL        Immunization History   Administered Date(s) Administered     Hep A, historic 07/27/2007, 02/13/2008     Influenza X9u8-22, 12/21/2009     Influenza high dose,seasonal,PF, 65+ yrs 10/11/2016     Influenza, inj, historic,unspecified 12/21/2007, 10/28/2008, 10/13/2010, 09/14/2011     Influenza, seasonal,quad inj 6-35 mos 10/25/2012     Pneumo Conj 13-V (2010&after) 11/23/2015     Td,adult,historic,unspecified 12/01/1997     Tdap 11/23/2015     ZOSTER, LIVE 12/02/2014           Electronically signed by Kelsey Lee PA-C 01/09/20 5:48 PM

## 2021-06-05 NOTE — PROGRESS NOTES
EKG read as no significant changes and labs are normal, cleared for surgery if cardiology reading is the same as this EKG initial reading. please call results to the patient or send a letter if not reachable by phone.

## 2021-06-05 NOTE — ANESTHESIA CARE TRANSFER NOTE
Last vitals:   Vitals:    01/13/20 1857   BP: 142/64   Pulse: 74   Resp: 16   Temp: 36.3  C (97.4  F)   SpO2: 100%     Patient's level of consciousness is drowsy  Spontaneous respirations: yes  Maintains airway independently: yes  Dentition unchanged: yes  Oropharynx: oropharynx clear of all foreign objects    QCDR Measures:  ASA# 20 - Surgical Safety Checklist: WHO surgical safety checklist completed prior to induction    PQRS# 430 - Adult PONV Prevention: 4558F - Pt received => 2 anti-emetic agents (different classes) preop & intraop  ASA# 8 - Peds PONV Prevention: NA - Not pediatric patient, not GA or 2 or more risk factors NOT present  PQRS# 424 - Ellen-op Temp Management: 4559F - At least one body temp DOCUMENTED => 35.5C or 95.9F within required timeframe  PQRS# 426 - PACU Transfer Protocol: - Transfer of care checklist used  ASA# 14 - Acute Post-op Pain: ASA14B - Patient did NOT experience pain >= 7 out of 10

## 2021-06-10 NOTE — ANESTHESIA POSTPROCEDURE EVALUATION
Patient: Aguilar Patterson  CYSTOSCOPY RIGHT URETEROSCOPIC STONE EXTRACTION  WITH LASER LITHOTRIPSY,  RIGHT URETERAL STENT PLACEMENT  Anesthesia type: general    Patient location: PACU  Last vitals:   Vitals:    05/11/17 1400   BP: 138/63   Pulse: 86   Resp: 17   Temp:    SpO2: 93%     Post vital signs: stable  Level of consciousness: awake and responds to simple questions  Post-anesthesia pain: pain controlled  Post-anesthesia nausea and vomiting: no  Pulmonary: unassisted, return to baseline  Cardiovascular: stable and blood pressure at baseline  Hydration: adequate  Anesthetic events: no    QCDR Measures:  ASA# 11 - Ellen-op Cardiac Arrest: ASA11B - Patient did NOT experience unanticipated cardiac arrest  ASA# 12 - Ellen-op Mortality Rate: ASA12B - Patient did NOT die  ASA# 13 - PACU Re-Intubation Rate: ASA13B - Patient did NOT require a new airway mgmt  ASA# 10 - Composite Anes Safety: ASA10A - No serious adverse event  ASA# 38 - New Corneal Injury: ASA38A - No new exposure keratitis or corneal abrasion in PACU    Additional Notes:

## 2021-06-10 NOTE — ANESTHESIA CARE TRANSFER NOTE
Last vitals:   Vitals:    05/11/17 1315   BP: 140/70   Pulse: 97   Resp: 12   Temp: 37.3  C (99.1  F)   SpO2: 100%     Patient's level of consciousness is drowsy  Spontaneous respirations: yes  Maintains airway independently: yes  Dentition unchanged: yes  Oropharynx: oropharynx clear of all foreign objects    QCDR Measures:  ASA# 20 - Surgical Safety Checklist: ASA20A - Safety Checks Done  PQRS# 430 - Adult PONV Prevention: 4558F - Pt received => 2 anti-emetic agents (different classes) preop & intraop  ASA# 8 - Peds PONV Prevention: NA - Not pediatric patient, not GA or 2 or more risk factors NOT present  PQRS# 424 - Ellen-op Temp Management: 4559F - At least one body temp DOCUMENTED => 35.5C or 95.9F within required timeframe  PQRS# 426 - PACU Transfer Protocol: - Transfer of care checklist used  ASA# 14 - Acute Post-op Pain: ASA14B - Patient did NOT experience pain >= 7 out of 10    I completed my SBAR handoff to the receiving nurse per policy and procedure.

## 2021-06-10 NOTE — PROGRESS NOTES
"Preoperative H&P    Surgeon: Dr Guillen   Date Of Surgery: 5/11/17  Procedure: CYSTOSCOPY RIGHT URETEROSCOPY WITH LASER LITHOTRIPSY RIGHT URETERAL STENT PLACEMENT    History of Present Illness:  Aguilar Patterson is a 71 y.o.  male who presents to the office today for a preoperative consultation at the request of Dr. Guillen for ureteroscopy with lithotripsy and right ureteral stent placement. H/o kidney stones and hydronephrosis? Dr. Guillen wants to do this sooner than later. He was called earlier and said \"how about surgery tomorrow\".     There is no history of anesthesia issues in the past. Denies history or recent abnormal bleeding     Review of Systems:   CV: Chest pain- not noted. Shortness of breath- not noted. Edema- not noted. BARRETO- not noted. Orthopnea- not noted.   GI: Abdominal pain- not noted. Nausea\vomiting\diarrhea-not noted. Melena or hematochezia- not noted.   : Urgency-not noted. Frequency- not noted. Dysuria- not noted. Hematuria- not noted. Incontinence- not noted.   CNS: Headaches- no significant symptoms. Dizziness- not noted. Visual Changes- not noted.     Physical Examination:    /82  Pulse 88  Ht 5' 11\" (1.803 m)  Wt 185 lb 1.6 oz (84 kg)  BMI 25.82 kg/m2  Wt Readings from Last 3 Encounters:   05/10/17 185 lb 1.6 oz (84 kg)   05/02/17 187 lb (84.8 kg)   10/25/16 174 lb (78.9 kg)     Body mass index is 25.82 kg/(m^2). (>25?)    GEN: alert, cooperative, no acute distress  ENT: External Canals: within normal limits.   Oropharynx: moist, no lesions, clear. Eyes: Conjunctiva: normal.   Neck: Supple, no adenopathy, no abnormal masses.   Chest: Within normal limits.  Cardiac: RRR, no rubs, no gallops.  Lungs: Breath Sounds normal, no crackles, no wheezing, no rhonchi.   Abd: soft, bowel sounds normal, no organomegaly, no tenderness.  Extr: Warm, no edema.   Skin: No rashes    Outpatient Encounter Prescriptions as of 5/10/2017   Medication Sig Dispense Refill     aspirin 81 MG EC tablet " Take 81 mg by mouth daily.       atorvastatin (LIPITOR) 40 MG tablet Take 40 mg by mouth at bedtime.       FOLIC ACID/MULTIVITS-MIN/LUT (CENTRUM SILVER ORAL) Take 1 tablet by mouth daily.       lisinopril (PRINIVIL,ZESTRIL) 20 MG tablet Take 20 mg by mouth every evening.        metFORMIN (GLUCOPHAGE) 500 MG tablet Take 500 mg by mouth 2 (two) times a day with meals.       nitroglycerin (NITROSTAT) 0.4 MG SL tablet Place 0.4 mg under the tongue every 5 (five) minutes as needed for chest pain.       pravastatin (PRAVACHOL) 80 MG tablet Take 80 mg by mouth bedtime.       Facility-Administered Encounter Medications as of 5/10/2017   Medication Dose Route Frequency Provider Last Rate Last Dose     Study Drug Ticagrelor 60 mg/Placebo <THEMIS> tablet 60 mg  1 tablet Oral BID Rebeca Cheng MD         Study Drug Ticagrelor 60 mg/Placebo <THEMIS> tablet 60 mg  1 tablet Oral BID Rebeca Cheng MD         Study Drug Ticagrelor 60 mg/Placebo <THEMIS> tablet 60 mg  1 tablet Oral BID Rebeca Cheng MD         Past Medical History:   Diagnosis Date     Basal cell carcinoma of skin 06/10/2016    left back, removed by primary excision     Bladder stone 5/12/2015     Carotid Artery Stenosis     Created by Ebook Glue Annotation: Nov 11 2013 12:07PM Miky Marshall: 50-69% by US      Colon adenoma 10/8/2015    COLONOSCOPY 10-1-15 DUE IN 5 YEARS      Coronary artery disease due to lipid rich plaque 2013     Dyslipidemia, goal LDL below 70 2013     Essential hypertension      Gall stones      History of transfusion      Hypercholesteremia      Kidney stones      Malignant Neoplasm Of The Prostate Gland 2013    Created by Ebook Glue Annotation: Feb 12 2013  1:26PM Pravin Aguayo Miky: DX: FEB 2013      Obstructive Sleep Apnea     He lost 50 pounds and does not use CPAP at this time (2016)     Prostate cancer      Supraclinoid carotid artery aneurysm, small     in abdomen     Type 2 Diabetes Mellitus       Umbilical hernia      Past Surgical History:   Procedure Laterality Date     CARDIAC SURGERY  2013    CABG3     CORONARY ARTERY BYPASS GRAFT  9/302013    LIMA to LAD, and 3 SVG's to DG, OM and RCA     LAPAROSCOPIC CHOLECYSTECTOMY N/A 9/11/2016    Procedure: CHOLECYSTECTOMY LAPAROSCOPIC WITH CHOLANGIOGRAMS;  Surgeon: Efraín Johnson MD;  Location: [facility];  Service:      NC LAP,PROSTATECTOMY,RADICAL,W/NERVE SPARE,INCL ROBOTIC Bilateral 10/18/2016    Procedure: DAVINCI RADICAL RETROPUBIC PROSTATECTOMY BILATERAL PELVIC LYMPH NODE DISSECTION, REPAIR UMBILICAL HERNIA, REMOVE BLADDER CALCULUS LYSIS OF ADHESIONS;  Surgeon: Ramon Purcell MD;  Location: [facility];  Service: Urology     PROSTATECTOMY  10/18/2016    Hernia and badder stone removal     Social History   Substance Use Topics     Smoking status: Never Smoker     Smokeless tobacco: Never Used     Alcohol use No     No Known Allergies    Diagnoses:     Encounter Diagnoses   Name Primary?     Pre-op examination Yes     Nephrolithiasis       1. Pre-op examination for lithotripsy due nephrolithiasis    Discussion-Plan:     Labs: none needed    EKG: NSR, no significant issues noted.     -Prophylaxis for cardiac events with perioperative beta-blockers: clinical risk factors not indicated.  -Avoidance of; Aspirin, Ibuprofen, Naproxen, and other NSAIDS prior to surgery. To call with any changes in present status  -can take daily meds with a sip of water morning of surgery     Cardiac Risk Estimation: RCRI for planned surgery is < 1%    Clinical Risk Factors:     -Cardiac: h/o CABG x 4, Blood pressure normally well controlled on hypertensives.      -Metabolic/Endocrine: CKD stage 3 or above. History of DM well controlled.    -Neurologic: no hx of CVA    Functional Capacity:     > 4 mets: able to climb stairs and walk 1-2 blocks w/o stopping    Presently Clinically Stable for Scheduled Surgery. No contraindications to planned surgery and  difficulty    Jd Castañeda MD

## 2021-06-10 NOTE — ANESTHESIA PREPROCEDURE EVALUATION
Anesthesia Evaluation      Patient summary reviewed   No history of anesthetic complications     Airway   Mallampati: II  Neck ROM: full   Pulmonary - normal exam    breath sounds clear to auscultation  (+) sleep apnea on no CPAP, ,   (-) shortness of breath, not a smoker                         Cardiovascular   Exercise tolerance: > or = 4 METS  (+) hypertension, CAD, CABG/stent, , hypercholesterolemia,     (-) angina, murmur  ECG reviewed  Rhythm: regular  Rate: normal,    no murmur      Neuro/Psych - negative ROS     Comments: JACOB and aneurysm  ED    Endo/Other    (+) diabetes mellitus type 2,      GI/Hepatic/Renal    (+)   chronic renal disease,     Comments: Malignant neoplasm of the prostate          Dental - normal exam                        Anesthesia Plan  Planned anesthetic: general LMA    ASA 3     Anesthetic plan and risks discussed with: patient  Anesthesia plan special considerations: antiemetics,   Post-op plan: routine recovery

## 2021-06-10 NOTE — PROGRESS NOTES
Subject seen for Visit 14THEMIS study (evaluate the effect of Ticagrelor 60 mg BID on incidence of CV death, MI or stroke in patients with Type 2 Diabetes Mellitus). Adverse events of interest and endpoints assessed. EQ-5D completed. Study drug returned ([id number]- he states that he has 14 pills left at home in the pill box) and new supply dispensed (Lot # [id number] and [id number]). Educated regarding medication compliance, diet and lifestyle. Will see him again in August. Pt states that he has had problem with incontinence since his Prostectomy last oct, 2016. He also states that he has had problems with blood in his urine since Jan. ,2017.    Start date: Jan, 2017 -  Date resolved: ongoing   Intensity: (mild ) Pt states that is has happened 3 times in the past 3 month and last for 5-7 days.  He saw Dr. Purcell( who knows he is doing the research study) and has a CT scan scheduled for 05/05/2017.   Study Medication adjustment: No  Outcome: (resolving)  Was treatment given for this event: Pending  Serious adverse event?  No   Special interest event? No  Endpoint? No   Fatal event? No  Relationship to trial treatment? Yes, suspect anti platelet agent could be causing hematuria.  Dr. Cheng: Please assess.  Lor Garner RN

## 2021-06-10 NOTE — TELEPHONE ENCOUNTER
Wellness Screening Tool  Symptom Screening:  Do you have one of the following NEW symptoms:    Fever (subjective or >100.0)?  No    A new cough?  No    Shortness of breath?  No     Chills? No     New loss of taste or smell? No     Generalized body aches? No     New persistent headache? No     New sore throat? No     Nausea, vomiting, or diarrhea?  No    Within the past 3 weeks, have you been exposed to someone with a known positive illness below:    COVID-19 (known or suspected)?  No    Chicken pox?  No    Mealses?  No    Pertussis?  No    Patient notified of visitor policy- They may have one person accompany them to their appointment, but they will need to wear a mask and will be screened upon arrival for symptoms: Yes  Pt informed to wear a mask: Yes  Pt notified if they develop any symptoms listed above, prior to their appointment, they are to call the clinic directly at [phone] for further instructions.  Yes  Patient's appointment status: Patient will be seen in clinic as scheduled on Fri 8-7-20 @ 0910 in  clinic with Dr. Smith.  mg

## 2021-06-10 NOTE — PATIENT INSTRUCTIONS - HE
Aguilar,    It was a pleasure to see you today at [facility].    We will post your lab results to [facility].    Best wishes with your move to [place].    Please call us if you have any questions or concerns about your heart.      Jose Smith M.D.  [facility]

## 2021-06-12 NOTE — PROGRESS NOTES
Themis research study:  .Subject seen for Visit 16, Month 36 THEMIS study (evaluate the effect of Ticagrelor 60 mg BID on incidence of CV death, MI or stroke in patients with Type 2 Diabetes Mellitus). Adverse events of interest and endpoints [coronary events/procedures, cva, vascular interventions, bleeding, hospiatlization] assessed and, if present, noted below. Medications [including ASA and ADP] reviewed and changes noted below.  EQ-5D completed. Study drug returned and new study drug was dispensed per the protocol. Pt states that he will be leaving 10/24/2017 for [place] and will be returning approx 05/01/2018. Will notify the monitor that the pt will be coming in early for study medication so he has enough for the time period that he is gone. Pt is still having problems with incontinence since the Proctectomy. He has tried Bio feed back with limited success. Educated regarding medication compliance, diet and lifestyle. Will see him again in October, 2017    Current Outpatient Prescriptions:      aspirin 81 MG EC tablet, Take 81 mg by mouth daily., Disp: , Rfl:      atorvastatin (LIPITOR) 40 MG tablet, Take 40 mg by mouth at bedtime., Disp: , Rfl:      FOLIC ACID/MULTIVITS-MIN/LUT (CENTRUM SILVER ORAL), Take 1 tablet by mouth daily., Disp: , Rfl:      lisinopril (PRINIVIL,ZESTRIL) 20 MG tablet, Take 20 mg by mouth every evening. , Disp: , Rfl:      metFORMIN (GLUCOPHAGE) 500 MG tablet, Take 500 mg by mouth 2 (two) times a day with meals., Disp: , Rfl:      nitroglycerin (NITROSTAT) 0.4 MG SL tablet, Place 0.4 mg under the tongue every 5 (five) minutes as needed for chest pain., Disp: , Rfl:      pravastatin (PRAVACHOL) 80 MG tablet, Take 80 mg by mouth bedtime., Disp: , Rfl:      sildenafil (VIAGRA) 100 MG tablet, Taking every other day, Disp: 60 tablet, Rfl: 0    Current Facility-Administered Medications:      Study Drug Ticagrelor 60 mg/Placebo <THEMIS> tablet 60 mg, 1 tablet, Oral, BID, Rebeca Cheng,  MD     Study Drug Ticagrelor 60 mg/Placebo <THEMIS> tablet 60 mg, 1 tablet, Oral, BID, Rebeca Cheng MD     Study Drug Ticagrelor 60 mg/Placebo <THEMIS> tablet 60 mg, 1 tablet, Oral, BID, Rebeca Cheng MD     Study Drug Ticagrelor 60 mg/Placebo <THEMIS> tablet 60 mg, 1 tablet, Oral, BID, Rebeca Cheng MD     1.Diagnosis/event description:  Bruising( pt states that he notices that he bruises easy and more thatn usual for the past yr. This is the first time that he has talked about this.  Start date: 07/01/2016- no bruising noted today.   Date resolved: ongoing   Intensity: (mild)  Study Medication adjustment: None  Outcome: (resolving)  Was treatment given for this event: No  Serious adverse event?  No   Special interest event? NO  Endpoint? No   Fatal event? No  Relationship to trial treatment? Yes.   Please assess. Pt wants to know if he should continue study med.    2.Diagnosis   Right shoulder pain( pt fell on a step)  Start date: 05/01/2017   Date resolved: Resolveing   Intensity: (mild)  Study Medication adjustment: No  Outcome: (resolving  Was treatment given for this event: Aleve am and pm with good relief.  Serious adverse event?  No   Special interest event? No  Endpoint? No   Fatal event? NO  Relationship to trial treatment?  No  Dr. Cheng Please assess.  Lor Garner RN

## 2021-06-13 NOTE — PROGRESS NOTES
Subject seen for Visit Unscheduled.THEMIS study (evaluate the effect of Ticagrelor 60 mg BID on incidence of CV death, MI or stroke in patients with Type 2 Diabetes Mellitus). Adverse events of interest and endpoints [coronary events/procedures, cva, vascular interventions, bleeding, hospiatlization] assessed . Pt did fall in the bathtub and injured his left arm in May, 2017. He takes Aleve for pain with good recovery. This will not be reported as an A/E as it does not meet the Themis guidelines. Medications [including ASA and ADP] reviewed and changes noted below.  EQ-5D completed. Study drug returned ([id number]  tabs of lot # [id number] returned) and new supply dispensed (Lot # [id number], redisensed : [id number] , [id number]). Educated regarding medication compliance, diet and lifestyle. Will see him again in May 2018.  Pt has had no A/E's this visit. He will be in [facility] on 11/2017 and returning May 1,2018.  Instructed to call if he has any new problems.  He will miss the Month 30 of the research visit.Themis is aware of this.       Current Outpatient Prescriptions:      aspirin 81 MG EC tablet, Take 81 mg by mouth daily., Disp: , Rfl:      atorvastatin (LIPITOR) 40 MG tablet, Take 40 mg by mouth at bedtime., Disp: , Rfl:      FOLIC ACID/MULTIVITS-MIN/LUT (CENTRUM SILVER ORAL), Take 1 tablet by mouth daily., Disp: , Rfl:      lisinopril (PRINIVIL,ZESTRIL) 20 MG tablet, Take 20 mg by mouth every evening. , Disp: , Rfl:      metFORMIN (GLUCOPHAGE) 500 MG tablet, Take 500 mg by mouth 2 (two) times a day with meals., Disp: , Rfl:      nitroglycerin (NITROSTAT) 0.4 MG SL tablet, Place 0.4 mg under the tongue every 5 (five) minutes as needed for chest pain., Disp: , Rfl:      sildenafil (VIAGRA) 100 MG tablet, Taking every other day, Disp: 60 tablet, Rfl: 0    Current Facility-Administered Medications:      Study Drug Ticagrelor 60 mg/Placebo <THEMIS> tablet 60 mg, 1 tablet, Oral, BID, Rebeca Cheng MD     Study Drug  Ticagrelor 60 mg/Placebo <THEMIS> tablet 60 mg, 1 tablet, Oral, BID, Rebeca Cheng MD     Study Drug Ticagrelor 60 mg/Placebo <THEMIS> tablet 60 mg, 1 tablet, Oral, BID, Rebeca Cheng MD     Study Drug Ticagrelor 60 mg/Placebo <THEMIS> tablet 60 mg, 1 tablet, Oral, BID, Rebeca Cheng MD     Study Drug Ticagrelor 60 mg/Placebo <THEMIS> tablet 60 mg, 1 tablet, Oral, BID, Rebeca Cheng MD

## 2021-06-17 NOTE — PROGRESS NOTES
Subject seen for Visit 18 Month 42-THEMIS study (evaluate the effect of Ticagrelor 60 mg BID on incidence of CV death, MI or stroke in patients with Type 2 Diabetes Mellitus).     Adverse events of interest and endpoints [coronary events/procedures, cva, vascular interventions, bleeding, hospiatlization] assessed and, if present, noted below. Pt continues to have problems with incontinence. He is following up with Urology.    Medications : No changes noted.  EQ-5D completed.   Study drug returned ([id number] - 18 tablets  returned) and new supply dispensed (Lot # [id number]- 210 tablets, [id number]- 210 tablets). Educated regarding medication compliance, diet and lifestyle. Will see him again in 6 months.    Lor Shelton, RN

## 2021-06-18 NOTE — PROGRESS NOTES
Assessment:      Annual Wellness Visit    Encounter Diagnoses   Name Primary?     H/O prostate cancer Yes     Dyslipidemia, goal LDL below 70      Coronary artery disease due to lipid rich plaque      DM II (diabetes mellitus, type II), controlled          Plan:      Patient Instructions   [facility]    PSAs and prostate cancer follow up through Urology    Will let Dr Smith deal with statin and possible increase in dose       Subjective:      Aguilar Patterson is a 72 y.o. male who presents for a Subsequent Annual Wellness Visit.      Healthy Habits:   Regular Exercise: Yes  Sunscreen Use: Yes  Healthy Diet: Yes  Dental Visits Regularly: Yes  Seat Belt: Yes  Sexually active: No  Monthly Self Testicular Exams:  Yes  Hemoccults: N/A  Flex Sig: N/A  Colonoscopy: Yes; 10-1-15  Lipid Profile: Yes  Glucose Screen: Yes  Prevention of Osteoporosis: Yes  Last Dexa: N/A  Guns at Home:  No      Immunization History   Administered Date(s) Administered     Hep A, historic 07/27/2007, 02/13/2008     Influenza [id number], 12/21/2009     Influenza high dose, seasonal 10/11/2016     Influenza, inj, historic,unspecified 12/21/2007, 10/28/2008, 10/13/2010, 09/14/2011     Influenza, seasonal,quad inj 6-35 mos 10/25/2012     Pneumo Conj 13-V (2010&after) 11/23/2015     Td,adult,historic,unspecified 12/01/1997     Tdap 11/23/2015     ZOSTER, LIVE 12/02/2014     Immunization status: up to date and documented, need [facility].    Current Outpatient Prescriptions   Medication Sig Dispense Refill     aspirin 81 MG EC tablet Take 81 mg by mouth daily.       atorvastatin (LIPITOR) 40 MG tablet Take 40 mg by mouth at bedtime.       FOLIC ACID/MULTIVITS-MIN/LUT (CENTRUM SILVER ORAL) Take 1 tablet by mouth daily.       lisinopril (PRINIVIL,ZESTRIL) 20 MG tablet Take 20 mg by mouth every evening.        metFORMIN (GLUCOPHAGE) 500 MG tablet Take 500 mg by mouth 2 (two) times a day with meals.        nitroglycerin (NITROSTAT) 0.4 MG SL tablet Place 0.4 mg under the tongue every 5 (five) minutes as needed for chest pain.       sildenafil (VIAGRA) 100 MG tablet Taking every other day 60 tablet 0     Current Facility-Administered Medications   Medication Dose Route Frequency Provider Last Rate Last Dose     Study Drug Ticagrelor 60 mg/Placebo <THEMIS> tablet 60 mg  1 tablet Oral BID Rebeca Cheng MD         Study Drug Ticagrelor 60 mg/Placebo <THEMIS> tablet 60 mg  1 tablet Oral BID Rebeca Cheng MD         Study Drug Ticagrelor 60 mg/Placebo <THEMIS> tablet 60 mg  1 tablet Oral BID Rebeca Cheng MD         Study Drug Ticagrelor 60 mg/Placebo <THEMIS> tablet 60 mg  1 tablet Oral BID Rebeca Cheng MD         Study Drug Ticagrelor 60 mg/Placebo <THEMIS> tablet 60 mg  1 tablet Oral BID Rebeca Cheng MD         Study Drug Ticagrelor 60 mg/Placebo <THEMIS> tablet 60 mg  1 tablet Oral BID Rebeca Cheng MD         Past Medical History:   Diagnosis Date     Basal cell carcinoma of skin 06/10/2016    left back, removed by primary excision     Bladder stone 5/12/2015     Calculus of gallbladder with chronic cholecystitis with obstruction      Carotid Artery Stenosis     Olivier, Miky: 50-69% by US      Colon adenoma 10/8/2015    COLONOSCOPY 10-1-15 DUE IN 5 YEARS      Coronary artery disease due to lipid rich plaque 2013     Dyslipidemia, goal LDL below 70 2013     Essential hypertension      Gall stones      History of transfusion      Kidney stones      Obstructive Sleep Apnea     He lost 50 pounds and does not use CPAP at this time (2016)     Prostate cancer      Supraclinoid carotid artery aneurysm, small      Type 2 Diabetes Mellitus      Umbilical hernia      Past Surgical History:   Procedure Laterality Date     CORONARY ARTERY BYPASS GRAFT  9/302013    LIMA to LAD, and 3 SVG's to DG, OM and RCA     CYSTOSCOPY W/ LASER LITHOTRIPSY Right 5/11/2017    Procedure: CYSTOSCOPY RIGHT  URETEROSCOPIC STONE EXTRACTION  WITH LASER LITHOTRIPSY,  RIGHT URETERAL STENT PLACEMENT;  Surgeon: Rohan Guillen MD;  Location: [facility] OR;  Service:      LAPAROSCOPIC CHOLECYSTECTOMY N/A 9/11/2016    Procedure: CHOLECYSTECTOMY LAPAROSCOPIC WITH CHOLANGIOGRAMS;  Surgeon: Efraín Johnson MD;  [place] [facility]  Service:      [place] LAP,PROSTATECTOMY,RADICAL,W/NERVE SPARE,INCL ROBOTIC Bilateral 10/18/2016    Procedure: DAVINCI RADICAL RETROPUBIC PROSTATECTOMY BILATERAL PELVIC LYMPH NODE DISSECTION, REPAIR UMBILICAL HERNIA, REMOVE BLADDER CALCULUS LYSIS OF ADHESIONS;  Surgeon: Ramon Purcell MD;  [place] [facility];  Service: Urology     PROSTATECTOMY  10/18/2016    Hernia and badder stone removal     Review of patient's allergies indicates no known allergies.  Family History   Problem Relation Age of Onset     Heart failure Mother      CABG Father 68     Sudden death Father 68     No Medical Problems Son      Social History     Social History     Marital status:      Spouse name: Renetta     Number of children: 1     Years of education: N/A     Occupational History      Retired     Social History Main Topics     Smoking status: Never Smoker     Smokeless tobacco: Never Used     Alcohol use No     Drug use: No     Sexual activity: Yes     Partners: Female     Birth control/ protection: Post-menopausal     Other Topics Concern     Not on file     Social History Narrative    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 He gets his [facility]. 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  He then was rehabilitated at [facility] in [place], [place].       Current Diet:  well balanced diet  Amount Consumed Per Day  NA    Exercise Type: walking  Exercise Frequency: 2-3 TIMES A WEEK    Mood Disorder and Cognitive Impairment Screenings  Anxiety Screening Tool:  GAD2       Anxiety Screening Tool Score:   "0  Depression Screening Tool:  PHQ9    Depression Screening Tool Score:  4  Cognitive Impairment and Additional Screening:  No difficulty. 5/5 ON MINI COG    Functional Ability/Level of Safety  Fall Risk Factors:  antihypertensive use and urinary incontinence  Home Safety Risk Factors: NA    Advanced Directive:  DOESN'T CURRENTLY HAVE    Co-Managers and Medical Equipment/Suppliers:  NA    Review of Systems  Review of Systems    H/O SKIN CANCER    DECREASE IN HEARING AND RINGING IN BOTH EARS (FROM [place])    HOARSENESS    INTOLERANCE TO HEAT OR COLD    PAIN  IN THE MUSCLES    LOSS OF SEXUAL SATISFACTION    URINATING MORE THAN ONCE A NIGHT    INCONTINENCE    Objective:     Vitals:    05/17/18 0759   BP: 152/84   Pulse: 80   Resp: 18   Temp: 98.7  F (37.1  C)   TempSrc: Oral   Weight: 189 lb 6.4 oz (85.9 kg)   Height: 5' 10.5\" (1.791 m)       GEN:  ALERT, ORIENTED TIMES THREE, NO APPARENT DISTRESS  HEENT:  TM'S NL                  PERRL                  THROAT CLEAR  NECK: SUPPLE WITHOUT ADENOPATHY, THYROMEGALY OR CAROTID BRUIT  LUNGS:  CTA  COR:  RRR WITHOUT MURMUR  ABDOMEN: SOFT, POSITIVE BOWEL SOUNDS, NONTX WITHOUT MASS  :  No exam  RECTAL:  No exam  EXT: NO CYANOSIS, CLUBBING OR EDEMA  SKIN:  NO ATYPICAL APPEARING SKIN LESIONS          Lab Results   Component Value Date    HGBA1C 5.5 05/16/2018     Lab Results   Component Value Date    CHOL 145 05/16/2018    CHOL 124 08/23/2017    CHOL 160 06/07/2016     Lab Results   Component Value Date    HDL 41 05/16/2018    HDL 41 08/23/2017    HDL 47 06/07/2016     Lab Results   Component Value Date    LDLCALC 83 05/16/2018    LDLCALC 66 08/23/2017    LDLCALC 84 06/07/2016     Lab Results   Component Value Date    TRIG 103 05/16/2018    TRIG 87 08/23/2017    TRIG 147 06/07/2016     Results for orders placed or performed in visit on 05/16/18   Comprehensive Metabolic Panel   Result Value Ref Range    Sodium 142 136 - 145 mmol/L    Potassium 4.5 3.5 - 5.0 mmol/L    Chloride " 108 (H) 98 - 107 mmol/L    CO2 24 22 - 31 mmol/L    Anion Gap, Calculation 10 5 - 18 mmol/L    Glucose 95 70 - 125 mg/dL    BUN 14 8 - 28 mg/dL    Creatinine 0.81 0.70 - 1.30 mg/dL    GFR MDRD Af Amer >60 >60 mL/min/1.73m2    GFR MDRD Non Af Amer >60 >60 mL/min/1.73m2    Bilirubin, Total 0.7 0.0 - 1.0 mg/dL    Calcium 9.4 8.5 - 10.5 mg/dL    Protein, Total 6.5 6.0 - 8.0 g/dL    Albumin 3.7 3.5 - 5.0 g/dL    Alkaline Phosphatase 73 45 - 120 U/L    AST 15 0 - 40 U/L    ALT 18 0 - 45 U/L     Lab Results   Component Value Date    UZLMAALBUR 1.42 05/16/2018       No components found for: CHOLHDL

## 2021-06-18 NOTE — PROGRESS NOTES
Subject's chart reviewed for Month 45-THEMIS study (evaluate the effect of Ticagrelor 60 mg BID on incidence of CV death, MI or stroke in patients with Type 2 Diabetes Mellitus).     Adverse events of interest and endpoints [coronary events/procedures, cva, vascular interventions, bleeding, hospiatlization] assessed - Non noted    Medications [including ASA and ADP] reviewed.    Will see for Month 48 in July or August.    Lor Garner RN

## 2021-06-19 NOTE — LETTER
Letter by Miky Aguayo MD at      Author: Miky Aguayo MD Service: -- Author Type: --    Filed:  Encounter Date: 8/2/2019 Status: (Other)         Aguilar Patterson  [address] [place] [address]   [facility] [address]             August 2, 2019         Dear Mr. Patterson,    Below are the results from your recent visit:    Resulted Orders   Glycosylated Hemoglobin A1c   Result Value Ref Range    Hemoglobin A1c 5.3 3.5 - 6.0 %   Lipid Cascade FASTING   Result Value Ref Range    Cholesterol 146 <=199 mg/dL    Triglycerides 97 <=149 mg/dL    HDL Cholesterol 48 >=40 mg/dL    LDL Calculated 79 <=129 mg/dL    Patient Fasting > 8hrs? Yes    Microalbumin, Random Urine   Result Value Ref Range    Microalbumin, Random Urine 1.46 0.00 - 1.99 mg/dL    Creatinine, Urine 170.0 mg/dL    Microalbumin/Creatinine Ratio Random Urine 8.6 <=19.9 mg/g    Narrative    Microalbumin, Random Urine  <2.0 mg/dL . . . . . . . . Normal  3.0-30.0 mg/dL . . . . . . Microalbuminuria  >30.0 mg/dL . . . . . .  . Clinical Proteinuria    Microalbumin/Creatinine Ratio, Random Urine  <20 mg/g . . . . .. . . . Normal   mg/g . . . . . . . Microalbuminuria  >300 mg/g . . . . . . . . Clinical Proteinuria           Adeel Hoang:  Your diabetic control as we discussed is fantastic!  The cholesterol panel is also fantastic!  There are no traces of protein in the urine.  Hopefully the morning dosing of you lisinopril will improve the blood pressure control.  Good seeing you in clinic the other day!    Please call with questions or contact us using [employer].    Sincerely,        Electronically signed by Miky Aguayo MD

## 2021-06-19 NOTE — PROGRESS NOTES
DONIIS    Pt here for the visit 20 of the Themis study (evaluate the effect of Ticagrelor 60 mg BID on incidence of CV death, MI or stroke in patients with Type 2 Diabetes Mellitus)    Adverse events of interest and endpoints [coronary events/procedures, cva, vascular interventions, bleeding, hospiatlization] assessed - None noted     Medications [including ASA and ADP] reviewed and any changes noted below.  Educated regarding medication compliance, diet and lifestyle.       Continues to take study medication 1 tab Bid.  Dispensed study drug number [id number] and [id number].  Returned study [id number] pills left 210 [id number] Compliance 88%  Lor Garner RN

## 2021-06-19 NOTE — LETTER
Letter by Darrell Smith MD at      Author: Darrell Smith MD Service: -- Author Type: --    Filed:  Encounter Date: 6/18/2019 Status: (Other)         Aguilar aPtterson  [address] [place] [address]   [facility] [address]      June 18, 2019      Dear Aguilar,    This letter is to remind you that you will be due for your follow up appointment with Dr. Darrell Smith  . To help ensure you are in the best health possible, a regular follow-up with your cardiologist is essential.     Please call our Patient Scheduling Line at [phone] to schedule your appointment at your earliest convenience.  If you have recently scheduled an appointment, please disregard this letter.    We look forward to seeing you again. As always, we are available at the number  above for any questions or concerns you may have.      Sincerely,     The Physicians and Staff [facility]

## 2021-06-20 NOTE — LETTER
Letter by Kelsey Lee PA-C at      Author: Kelsey Lee PA-C Service: -- Author Type: --    Filed:  Encounter Date: 1/10/2020 Status: Signed         Aguilar Patterson  [address] [place] [address]   [facility] [address]             January 10, 2020         Dear Mr. Patterson,    Below are the results from your recent visit:    Resulted Orders   Basic Metabolic Panel   Result Value Ref Range    Sodium 143 136 - 145 mmol/L    Potassium 4.1 3.5 - 5.0 mmol/L    Chloride 106 98 - 107 mmol/L    CO2 26 22 - 31 mmol/L    Anion Gap, Calculation 11 5 - 18 mmol/L    Glucose 114 70 - 125 mg/dL    Calcium 9.8 8.5 - 10.5 mg/dL    BUN 17 8 - 28 mg/dL    Creatinine 1.11 0.70 - 1.30 mg/dL    GFR MDRD Af Amer >60 >60 mL/min/1.73m2    GFR MDRD Non Af Amer >60 >60 mL/min/1.73m2    Narrative    Fasting Glucose reference range is 70-99 mg/dL per  American Diabetes Association (ADA) guidelines.   HM1 (CBC with Diff)   Result Value Ref Range    WBC 6.6 4.0 - 11.0 thou/uL    RBC 4.58 4.40 - 6.20 mill/uL    Hemoglobin 13.3 (L) 14.0 - 18.0 g/dL    Hematocrit 39.0 (L) 40.0 - 54.0 %    MCV 85 80 - 100 fL    MCH 29.0 27.0 - 34.0 pg    MCHC 34.0 32.0 - 36.0 g/dL    RDW 13.0 11.0 - 14.5 %    Platelets 240 140 - 440 thou/uL    MPV 6.7 (L) 7.0 - 10.0 fL    Neutrophils % 57 50 - 70 %    Lymphocytes % 30 20 - 40 %    Monocytes % 10 2 - 10 %    Eosinophils % 3 0 - 6 %    Basophils % 1 0 - 2 %    Neutrophils Absolute 3.8 2.0 - 7.7 thou/uL    Lymphocytes Absolute 2.0 0.8 - 4.4 thou/uL    Monocytes Absolute 0.6 0.0 - 0.9 thou/uL    Eosinophils Absolute 0.2 0.0 - 0.4 thou/uL    Basophils Absolute 0.1 0.0 - 0.2 thou/uL       EKG read as no significant changes and labs are normal, cleared for surgery if cardiology reading is the same as this EKG initial reading.     Please call with questions or contact us using [employer].    Sincerely,        Electronically signed by Kelsey Lee PA-C

## 2021-06-20 NOTE — LETTER
Letter by Kelsey Lee PA-C at      Author: Kelsey Lee PA-C Service: -- Author Type: --    Filed:  Encounter Date: 1/10/2020 Status: Signed         Aguilar Patterson  [address] [place] [address]   [facility] [address]             January 10, 2020         Dear Mr. Patterson,    Below are the results from your recent visit:    Resulted Orders   Electrocardiogram Perform - Clinic   Result Value Ref Range    SYSTOLIC BLOOD PRESSURE      DIASTOLIC BLOOD PRESSURE      VENTRICULAR RATE 67 BPM    ATRIAL RATE 67 BPM    P-R INTERVAL 174 ms    QRS DURATION 92 ms    Q-T INTERVAL 398 ms    QTC CALCULATION (BEZET) 420 ms    P Axis 69 degrees    R AXIS 16 degrees    T AXIS 34 degrees    MUSE DIAGNOSIS       Sinus rhythm  Possible Left atrial enlargement  Minimal criteria for Nonspecific ST abnormality  Abnormal ECG  When compared with ECG of 10-MAY-2017 15:59,  No significant change was found  Confirmed by KEVIN SMITH MD LOC:[facility] ([id number]) on 1/10/2020 11:36:19 AM                Dr. Smith read EKG as unchanged and you are now cleared for your surgery         Please call with questions or contact us using [employer].    Sincerely,        Electronically signed by Kelsey Lee PA-C

## 2021-06-21 NOTE — PROGRESS NOTES
THEMIS  End of Study  Pt called  for the End of Study visit of the Themis study (evaluate the effect of Ticagrelor 60 mg BID on incidence of CV death, MI or stroke in patients with Type 2 Diabetes Mellitus) Pt in [place] and this was done via telephone . He answered all the EQ-5D questions.     Adverse events of interest and endpoints [coronary events/procedures, cva, vascular interventions, bleeding, hospiatlization] assessed and the following Adverse events are still active.  1. Periodic Bruising is ongoing. The pt thinks that this is lessening.  2. Right shoulder pain is ongoing.  3 .Incontinence is ongoing     Medications [including ASA and ADP] reviewed and any changes noted below.  Educated regarding medication compliance, diet and lifestyle.     Change in medication- None      Last dose of study medication was on 10/11/2018 as noted in last documention  The Pt will not be returning to [place] until April, 2019. Per the note from THEMIS- the pt can throw away the study medication when he returns.  This will be considered the last study visit for the subject. I thanked the patient for his participation in the study.      Lor Garner RN

## 2021-06-21 NOTE — PROGRESS NOTES
THEMIS phone call: Late entry. Phone call done on 10/17/2018.   Called Pt for update of the end study parameters for the Themis study (evaluate the effect of Ticagrelor 60 mg BID on incidence of CV death, MI or stroke in patients with Type 2 Diabetes Mellitus)    Adverse events of interest and endpoints [coronary events/procedures, cva, vascular interventions, bleeding, hospiatlization] assessed and, if present, noted below.= See ongoing adverse event note of Bruising.     Medications [including ASA and ADP] reviewed and any changes noted below.  Educated regarding medication compliance, diet and lifestyle.     Change in medication: None     Pt informed writer that he had discontinue the study medication on 10/11/2018. He stated that he had decided to stop the study medication to see if he would be having any problems. He wanted to see if the Adverse event of Bruising that has gone on prn since 07/01/2016 would diminish.At this time the bruising has not changed. He stated that he would not want to restart the study medication . He is leaving for [place] on 10/20/2018.   Returned study drug: He would not have time to drop off the study medication.      I will be calling the pt between October 30-and Nov 28th to see if the bruising has resolved and to check adverse events.    Lor Garner RN

## 2021-06-25 NOTE — PROGRESS NOTES
"Progress Notes by Darrell Smith MD at 8/30/2017  8:50 AM     Author: Darrell Smith MD Service: -- Author Type: Physician    Filed: 8/30/2017  9:29 AM Encounter Date: 8/30/2017 Status: Signed    : Darrell Smith MD (Physician)           Click to link to [facility]     [facility] Clinic Note      Assessment:    1. Coronary artery disease due to lipid rich plaque     2. Dyslipidemia, goal LDL below 70         Plan:    1.  Return to see Dr. Smith in 1 year after new lipid profile.    An After Visit Summary was printed and given to the patient.    Subjective:    Aguilar Patterson returned for a planned annual follow up visit.    Since our last visit he had laparoscopic cholecystectomy for cholecystitis, radical prostatectomy using the robot to treat prostate cancer, and cystoscopy with ureteral stone and extraction.    Aguilar states he gets a chronic, stable, exertional left upper chest discomfort when he walks with his wife.  He states \"she walks too fast\".  If he asked her to slow down the symptom resolves.  He says this symptom has been present ever since bypass surgery.  He does not wish to undergo stress testing at this time as it does not significantly impair his quality of life.    He notices easy bruising since being placed on a study drug.  He may be on ticagrelor.  He does not experience unusual shortness of breath, palpitations, lightheadedness, or lower extremity edema.    Past Medical History:    Patient Active Problem List   Diagnosis   ? Dyslipidemia, goal LDL below 70   ? Malignant Neoplasm Of The Prostate Gland   ? Essential hypertension   ? Male Erectile Disorder   ? Coronary artery disease due to lipid rich plaque   ? DM II (diabetes mellitus, type II), controlled       Past Medical History:   Diagnosis Date   ? Basal cell carcinoma of skin 06/10/2016    left back, removed by primary excision   ? Bladder stone 5/12/2015   ? Calculus of gallbladder with chronic " cholecystitis with obstruction    ? Carotid Artery Stenosis     Miky Aguayo: 50-69% by US    ? Colon adenoma 10/8/2015    COLONOSCOPY 10-1-15 DUE IN 5 YEARS    ? Coronary artery disease due to lipid rich plaque 2013   ? Dyslipidemia, goal LDL below 70 2013   ? Essential hypertension    ? Gall stones    ? History of transfusion    ? Kidney stones    ? Obstructive Sleep Apnea     He lost 50 pounds and does not use CPAP at this time (2016)   ? Prostate cancer    ? Supraclinoid carotid artery aneurysm, small    ? Type 2 Diabetes Mellitus    ? Umbilical hernia        Past Surgical History:   Procedure Laterality Date   ? CORONARY ARTERY BYPASS GRAFT  9/302013    LIMA to LAD, and 3 SVG's to DG, OM and RCA   ? CYSTOSCOPY W/ LASER LITHOTRIPSY Right 5/11/2017    Procedure: CYSTOSCOPY RIGHT URETEROSCOPIC STONE EXTRACTION  WITH LASER LITHOTRIPSY,  RIGHT URETERAL STENT PLACEMENT;  Surgeon: Rohan Guillen MD;  Location: [facility];  Service:    ? LAPAROSCOPIC CHOLECYSTECTOMY N/A 9/11/2016    Procedure: CHOLECYSTECTOMY LAPAROSCOPIC WITH CHOLANGIOGRAMS;  Surgeon: Efraín Johnson MD;  Location: [facility];  Service:    ? HI LAP,PROSTATECTOMY,RADICAL,W/NERVE SPARE,INCL ROBOTIC Bilateral 10/18/2016    Procedure: DAVINCI RADICAL RETROPUBIC PROSTATECTOMY BILATERAL PELVIC LYMPH NODE DISSECTION, REPAIR UMBILICAL HERNIA, REMOVE BLADDER CALCULUS LYSIS OF ADHESIONS;  Surgeon: Ramon Purcell MD;  Location: [facility];  Service: Urology   ? PROSTATECTOMY  10/18/2016    Hernia and badder stone removal        reports that he has never smoked. He has never used smokeless tobacco. He reports that he does not drink alcohol or use illicit drugs.    Family History   Problem Relation Age of Onset   ? Heart failure Mother    ? CABG Father 68   ? Sudden death Father 68   ? No Medical Problems Son        Outpatient Encounter Prescriptions as of 8/30/2017   Medication Sig Dispense Refill   ? aspirin 81 MG EC  "tablet Take 81 mg by mouth daily.     ? atorvastatin (LIPITOR) 40 MG tablet Take 40 mg by mouth at bedtime.     ? FOLIC ACID/MULTIVITS-MIN/LUT (CENTRUM SILVER ORAL) Take 1 tablet by mouth daily.     ? lisinopril (PRINIVIL,ZESTRIL) 20 MG tablet Take 20 mg by mouth every evening.      ? metFORMIN (GLUCOPHAGE) 500 MG tablet Take 500 mg by mouth 2 (two) times a day with meals.     ? nitroglycerin (NITROSTAT) 0.4 MG SL tablet Place 0.4 mg under the tongue every 5 (five) minutes as needed for chest pain.     ? sildenafil (VIAGRA) 100 MG tablet Taking every other day 60 tablet 0     Facility-Administered Encounter Medications as of 8/30/2017   Medication Dose Route Frequency Provider Last Rate Last Dose   ? Study Drug Ticagrelor 60 mg/Placebo <THEMIS> tablet 60 mg  1 tablet Oral BID Rebeca Cheng MD       ? Study Drug Ticagrelor 60 mg/Placebo <THEMIS> tablet 60 mg  1 tablet Oral BID Rebeca Cheng MD       ? Study Drug Ticagrelor 60 mg/Placebo <THEMIS> tablet 60 mg  1 tablet Oral BID Rebeca Cheng MD       ? Study Drug Ticagrelor 60 mg/Placebo <THEMIS> tablet 60 mg  1 tablet Oral BID Rebeca Cheng MD           Review of Systems:     General: WNL  Eyes: WNL  Ears/Nose/Throat: WNL  Lungs: WNL  Heart: WNL  Stomach: WNL  Bladder: WNL  Muscle/Joints: Joint Pain  Skin: Poor Wound Healing  Nervous System: Daytime Sleepiness  Mental Health: WNL     Blood: Easy Bruising    Objective:     /72 (Patient Site: Right Arm, Patient Position: Sitting, Cuff Size: Adult Large)  Pulse 92  Resp 16  Ht 5' 11\" (1.803 m)  Wt 188 lb 9.6 oz (85.5 kg)  BMI 26.3 kg/m2  Wt Readings from Last 5 Encounters:   08/30/17 188 lb 9.6 oz (85.5 kg)   08/09/17 189 lb (85.7 kg)   05/11/17 181 lb (82.1 kg)   05/10/17 185 lb 1.6 oz (84 kg)   05/02/17 187 lb (84.8 kg)        Physical Exam:    GENERAL APPEARANCE: alert, no apparent distress  EYES: no scleral icterus  NECK: no carotid bruits or adenopathy, jugular venous pressure is less " than 5 cm  CHEST: symmetric, the lungs are clear to auscultation  CARDIOVASCULAR: regular rhythm without murmur or gallop  EXTREMITIES: no cyanosis, clubbing or edema  SKIN: no xanthelasma  NEUROLOGIC: normal gait and coordination  PSYCHIATRIC: mood and affect are normal    Cardiac Testing:    Results for orders placed during the hospital encounter of 06/16/15   NM Exercise Stress Test [TTM739] 06/16/2015    Narrative NUCLEAR STRESS STUDY    PRIMARY PHYSICIAN:  Miky Aguayo.    REQUESTING:  Jose Smith.      INDICATION:  Chest pain.    STRESS:  The patient exercised 6 minutes 33 seconds according to a Juan   protocol,  stopping secondary to chest pain.  Maximal heart rate achieved was 130   beats per  minute, which was 86% of predicted.  Blood pressure remained flat   beginning at  142/88 and ending at 140/82 yielding a double product 18,460.  The patient   did  report 1 out of 10 chest pain prior to exercise, which increased to 4/10   at end  exercise and resolved.  EKG was positive for ischemia per the reviewing  cardiologist, Dr. Scherer.    IMAGES:  Rest images were obtained following the administration of 3.99   mCi thallium  201.  Stress imaging was obtained following exercise and the   administration of 32.2  mCi technetium 99m sestamibi.   is satisfactory.    FINDINGS:  These images demonstrate normal tracer uptake pattern in all   wall  segments.  Gated SPECT demonstrates an ejection fraction of 58%.  There is   abnormal  septal motion consistent with the patient's previous bypass surgery.    CONCLUSION:  1.  Juan protocol stress which was subjectively and objectively positive.  2.  No evidence of ischemia or infarction by nuclear imaging.  3.  Normal ejection fraction with abnormal septal motion consistent with   the  patient's previous bypass surgery.      MD Delio CABRERA 06/16/2015 15:34:20  T 06/16/2015 16:08:34  R 06/16/2015 16:08:34  [id number]      cc: KEVIN MCKEON  KARISSA FOSTER          Imaging:    No results found.    Lab Results:    Lab Results   Component Value Date    CREATININE 0.85 08/23/2017    BUN 13 08/23/2017     08/23/2017    K 4.3 08/23/2017     (H) 08/23/2017    CO2 27 08/23/2017     Lab Results   Component Value Date    CHOL 124 08/23/2017    TRIG 87 08/23/2017    HDL 41 08/23/2017     Creatinine (mg/dL)   Date Value   08/23/2017 0.85   10/18/2016 0.85   10/11/2016 0.87   09/13/2016 0.77     Magnesium (mg/dL)   Date Value   09/11/2016 1.9   10/02/2013 2.3   10/01/2013 2.0     LDL Calculated (mg/dL)   Date Value   08/23/2017 66   06/07/2016 84   05/05/2015 72           Much or all of the text in this note was generated through the use of the Dragon Dictate voice-to-text software. Errors in spelling or words which seem out of context are unintentional. Sound alike errors, in particular, may have escaped editing.

## 2021-06-26 ENCOUNTER — HEALTH MAINTENANCE LETTER (OUTPATIENT)
Age: 76
End: 2021-06-26

## 2021-06-26 NOTE — PROGRESS NOTES
Progress Notes by Darrell Smith MD at 7/23/2018  2:30 PM     Author: Darrell Smith MD Service: -- Author Type: Physician    Filed: 7/23/2018  3:09 PM Encounter Date: 7/23/2018 Status: Signed    : Darrell Smith MD (Physician)           Click to link to [facility]     [facility] Clinic Note      Assessment:    1. Coronary artery disease due to lipid rich plaque     2. Dyslipidemia, goal LDL below 70  atorvastatin (LIPITOR) 80 MG tablet    Lipid Profile       Plan:    1. Increase atorvastatin to 80 mg orally daily and recheck lipid profile in 3 months.  2. Return to see Dr. Smith in 1 year.    An After Visit Summary was printed and given to the patient.    Subjective:    Aguilar Patterson returned for a planned annual follow up visit.    He has done well over the last year.  He does not report any cardiac symptoms except for his chronic, stable exertional chest tightness when his wife makes him walk too fast.  He has declined stress testing for this in the past.  He does not report palpitations, lightheadedness, shortness of breath or lower extremity edema.    Past Medical History:    Patient Active Problem List   Diagnosis   ? Dyslipidemia, goal LDL below 70   ? Essential hypertension   ? Male Erectile Disorder   ? Coronary artery disease due to lipid rich plaque   ? DM II (diabetes mellitus, type II), controlled (H)       Past Medical History:   Diagnosis Date   ? Basal cell carcinoma of skin 06/10/2016    left back, removed by primary excision   ? Bladder stone 5/12/2015   ? Calculus of gallbladder with chronic cholecystitis with obstruction    ? Carotid Artery Stenosis     Miky Aguayo: 50-69% by US    ? Colon adenoma 10/8/2015    COLONOSCOPY 10-1-15 DUE IN 5 YEARS    ? Coronary artery disease due to lipid rich plaque 2013   ? Dyslipidemia, goal LDL below 70 2013   ? Essential hypertension    ? History of transfusion    ? Kidney stones    ? Obstructive Sleep Apnea     He  lost 50 pounds and does not use CPAP at this time (2016)   ? Prostate cancer (H)    ? Supraclinoid carotid artery aneurysm, small    ? Type 2 Diabetes Mellitus    ? Umbilical hernia        Past Surgical History:   Procedure Laterality Date   ? CORONARY ARTERY BYPASS GRAFT  9/302013    LIMA to LAD, and 3 SVG's to DG, OM and RCA   ? CYSTOSCOPY W/ LASER LITHOTRIPSY Right 5/11/2017    Procedure: CYSTOSCOPY RIGHT URETEROSCOPIC STONE EXTRACTION  WITH LASER LITHOTRIPSY,  RIGHT URETERAL STENT PLACEMENT;  Surgeon: Rohan Guillen MD;  Location: [facility];  Service:    ? LAPAROSCOPIC CHOLECYSTECTOMY N/A 9/11/2016    Procedure: CHOLECYSTECTOMY LAPAROSCOPIC WITH CHOLANGIOGRAMS;  Surgeon: Efraín Johnson MD;  Location: [facility];  Service:    ? PA LAP,PROSTATECTOMY,RADICAL,W/NERVE SPARE,INCL ROBOTIC Bilateral 10/18/2016    Procedure: DAVINCI RADICAL RETROPUBIC PROSTATECTOMY BILATERAL PELVIC LYMPH NODE DISSECTION, REPAIR UMBILICAL HERNIA, REMOVE BLADDER CALCULUS LYSIS OF ADHESIONS;  Surgeon: Ramon Purcell MD;  Location: [facility];  Service: Urology        reports that he has never smoked. He has never used smokeless tobacco. He reports that he does not drink alcohol or use illicit drugs.    Family History   Problem Relation Age of Onset   ? Heart failure Mother    ? CABG Father 68   ? Sudden death Father 68   ? No Medical Problems Son        Outpatient Encounter Prescriptions as of 7/23/2018   Medication Sig Dispense Refill   ? aspirin 81 MG EC tablet Take 81 mg by mouth daily.     ? atorvastatin (LIPITOR) 80 MG tablet Take 1 tablet (80 mg total) by mouth daily. 90 tablet 3   ? FOLIC ACID/MULTIVITS-MIN/LUT (CENTRUM SILVER ORAL) Take 1 tablet by mouth daily.     ? lisinopril (PRINIVIL,ZESTRIL) 20 MG tablet Take 20 mg by mouth every evening.      ? metFORMIN (GLUCOPHAGE) 500 MG tablet Take 500 mg by mouth 2 (two) times a day with meals.     ? nitroglycerin (NITROSTAT) 0.4 MG SL tablet  "Place 0.4 mg under the tongue every 5 (five) minutes as needed for chest pain.     ? sildenafil (VIAGRA) 100 MG tablet Taking every other day 60 tablet 0   ? [DISCONTINUED] atorvastatin (LIPITOR) 40 MG tablet Take 40 mg by mouth at bedtime.       Facility-Administered Encounter Medications as of 7/23/2018   Medication Dose Route Frequency Provider Last Rate Last Dose   ? Study Drug Ticagrelor 60 mg/Placebo <THEMIS> tablet 60 mg  1 tablet Oral BID Rebeca Cheng MD       ? Study Drug Ticagrelor 60 mg/Placebo <THEMIS> tablet 60 mg  1 tablet Oral BID Rebeca Cheng MD       ? Study Drug Ticagrelor 60 mg/Placebo <THEMIS> tablet 60 mg  1 tablet Oral BID Rebeca Cheng MD       ? Study Drug Ticagrelor 60 mg/Placebo <THEMIS> tablet 60 mg  1 tablet Oral BID Rebeca Cheng MD       ? Study Drug Ticagrelor 60 mg/Placebo <THEMIS> tablet 60 mg  1 tablet Oral BID Rebeca Cheng MD       ? Study Drug Ticagrelor 60 mg/Placebo <THEMIS> tablet 60 mg  1 tablet Oral BID Rebeca Cheng MD           Review of Systems:     General: WNL  Eyes: WNL  Ears/Nose/Throat: WNL  Lungs: WNL  Heart: WNL  Stomach: Diarrhea  Bladder: Frequent Urination at Night  Muscle/Joints: Joint Pain  Skin: WNL  Nervous System: Dizziness  Mental Health: Depression     Blood: Easy Bruising    Objective:     /76 (Patient Site: Left Arm, Patient Position: Sitting, Cuff Size: Adult Regular)  Pulse 88  Resp 16  Ht 5' 11\" (1.803 m)  Wt 188 lb (85.3 kg)  BMI 26.22 kg/m2  Wt Readings from Last 5 Encounters:   07/23/18 188 lb (85.3 kg)   05/17/18 189 lb 6.4 oz (85.9 kg)   05/03/18 193 lb (87.5 kg)   10/18/17 191 lb (86.6 kg)   08/30/17 188 lb 9.6 oz (85.5 kg)        Physical Exam:    GENERAL APPEARANCE: alert, no apparent distress  EYES: no scleral icterus  NECK: no carotid bruits or adenopathy, jugular venous pressure is less than 5 cm  CHEST: symmetric, the lungs are clear to auscultation  CARDIOVASCULAR: regular rhythm without murmur or " gallop  EXTREMITIES: no cyanosis, clubbing or edema  SKIN: no xanthelasma  NEUROLOGIC: normal gait and coordination  PSYCHIATRIC: mood and affect are normal    Cardiac Testing:    Results for orders placed during the hospital encounter of 06/16/15   NM Exercise Stress Test [LUX268] 06/16/2015    Narrative NUCLEAR STRESS STUDY    PRIMARY PHYSICIAN:  Miky Hancock.    REQUESTING:  Jose Smith.      INDICATION:  Chest pain.    STRESS:  The patient exercised 6 minutes 33 seconds according to a Juan   protocol,  stopping secondary to chest pain.  Maximal heart rate achieved was 130   beats per  minute, which was 86% of predicted.  Blood pressure remained flat   beginning at  142/88 and ending at 140/82 yielding a double product 18,460.  The patient   did  report 1 out of 10 chest pain prior to exercise, which increased to 4/10   at end  exercise and resolved.  EKG was positive for ischemia per the reviewing  cardiologist, Dr. Scherer.    IMAGES:  Rest images were obtained following the administration of 3.99   mCi thallium  201.  Stress imaging was obtained following exercise and the   administration of 32.2  mCi technetium 99m sestamibi.   is satisfactory.    FINDINGS:  These images demonstrate normal tracer uptake pattern in all   wall  segments.  Gated SPECT demonstrates an ejection fraction of 58%.  There is   abnormal  septal motion consistent with the patient's previous bypass surgery.    CONCLUSION:  1.  Juan protocol stress which was subjectively and objectively positive.  2.  No evidence of ischemia or infarction by nuclear imaging.  3.  Normal ejection fraction with abnormal septal motion consistent with   the  patient's previous bypass surgery.      MD Delio CABRERA 06/16/2015 15:34:20  T 06/16/2015 16:08:34  R 06/16/2015 16:08:34  [id number]      cc: KEVIN HANCOCK MD          Imaging:    No results found.    Lab Results:    Lab Results   Component Value Date     CREATININE 0.81 05/16/2018    BUN 14 05/16/2018     05/16/2018    K 4.5 05/16/2018     (H) 05/16/2018    CO2 24 05/16/2018     Lab Results   Component Value Date    CHOL 145 05/16/2018    TRIG 103 05/16/2018    HDL 41 05/16/2018     Creatinine (mg/dL)   Date Value   05/16/2018 0.81   08/23/2017 0.85   10/18/2016 0.85   10/11/2016 0.87     Magnesium (mg/dL)   Date Value   09/11/2016 1.9   10/02/2013 2.3   10/01/2013 2.0     LDL Calculated (mg/dL)   Date Value   05/16/2018 83   08/23/2017 66   06/07/2016 84     Lab Results   Component Value Date    WBC 6.5 10/18/2016    WBC 7.0 09/04/2014    HGB 11.0 (L) 10/19/2016    HCT 41.3 10/18/2016    MCV 83 10/18/2016     10/18/2016           Much or all of the text in this note was generated through the use of the Dragon Dictate voice-to-text software. Errors in spelling or words which seem out of context are unintentional. Sound alike errors, in particular, may have escaped editing.

## 2021-06-28 NOTE — PROGRESS NOTES
"Progress Notes by Darrell Smith MD at 10/2/2019  1:30 PM     Author: Darrell Smith MD Service: -- Author Type: Physician    Filed: 10/2/2019  2:11 PM Encounter Date: 10/2/2019 Status: Addendum    : Darrell Smith MD (Physician)    Related Notes: Original Note by Darrell Smith MD (Physician) filed at 10/2/2019  2:10 PM           Click to link to [facility] Note      Assessment:    1. Coronary artery disease due to lipid rich plaque     2. Dyslipidemia, goal LDL below 70  rosuvastatin (CRESTOR) 40 MG tablet    Lipid Profile       Plan:    1. Switch from atorvastatin to rosuvastatin 40 mg orally daily.  As he is returning to [place] in 3 weeks and will remain there until April, we will recheck his lipid profile in late April when he returns.  2. He reports long-term left upper pectoral chest discomfort when he walks too quickly; we discussed repeating his stress test and he would like to hold off unless the symptoms worsen.  3. Return to see Dr. Smith in 1 year.    An After Visit Summary was printed and given to the patient.    Subjective:    Aguilar Patterson returned for a planned annual follow up visit.    Viktor states he had a good summer and enjoys playing golf.  He uses a cart most of the time but tries to walk once a week when he plays.  He states that he walks 3 times a week for 30 minutes with his wife.  He tells me that she walks too fast and this sometimes makes him short of breath.  He also experiences a discomfort in the left upper pectoral area.  At first, he told me he only gets this discomfort when he \"jumps out of bed\" in the morning and that it lasts for a few seconds.  Later he tells me that he sometimes has this sensation when his wife is walking too quickly for him.    He does not report orthopnea, palpitations, lightheadedness, dizziness, syncope, or edema.    He states his diet could be more prudent and I encouraged him to do " that.    Past Medical History:    Patient Active Problem List   Diagnosis   ? Dyslipidemia, goal LDL below 70   ? Essential hypertension   ? Male Erectile Disorder   ? Coronary artery disease due to lipid rich plaque   ? DM II (diabetes mellitus, type II), controlled (H)       Past Medical History:   Diagnosis Date   ? Basal cell carcinoma of skin 06/10/2016    left back, removed by primary excision   ? Bladder stone 5/12/2015   ? Calculus of gallbladder with chronic cholecystitis with obstruction    ? Carotid Artery Stenosis     Olivier, Miky: 50-69% by US    ? Colon adenoma 10/8/2015    COLONOSCOPY 10-1-15 DUE IN 5 YEARS    ? Coronary artery disease due to lipid rich plaque 2013   ? Dyslipidemia, goal LDL below 70 2013   ? Essential hypertension    ? History of transfusion    ? Kidney stones    ? Obstructive Sleep Apnea     He lost 50 pounds and does not use CPAP at this time (2016)   ? Prostate cancer (H)    ? Supraclinoid carotid artery aneurysm, small    ? Type 2 Diabetes Mellitus    ? Umbilical hernia        Past Surgical History:   Procedure Laterality Date   ? CORONARY ARTERY BYPASS GRAFT  9/302013    LIMA to LAD, and 3 SVG's to DG, OM and RCA   ? CYSTOSCOPY W/ LASER LITHOTRIPSY Right 5/11/2017    Procedure: CYSTOSCOPY RIGHT URETEROSCOPIC STONE EXTRACTION  WITH LASER LITHOTRIPSY,  RIGHT URETERAL STENT PLACEMENT;  Surgeon: Rohan Guillen MD;  Location: [facility] OR;  Service:    ? LAPAROSCOPIC CHOLECYSTECTOMY N/A 9/11/2016    Procedure: CHOLECYSTECTOMY LAPAROSCOPIC WITH CHOLANGIOGRAMS;  Surgeon: Efraín Johnson MD;  Location: [facility] OR;  Service:    ? IL LAP,PROSTATECTOMY,RADICAL,W/NERVE SPARE,INCL ROBOTIC Bilateral 10/18/2016    Procedure: DAVINCI RADICAL RETROPUBIC PROSTATECTOMY BILATERAL PELVIC LYMPH NODE DISSECTION, REPAIR UMBILICAL HERNIA, REMOVE BLADDER CALCULUS LYSIS OF ADHESIONS;  Surgeon: Ramon Purcell MD;  Location: [facility] OR;  Service: Urology         reports that he has never smoked. He has never used smokeless tobacco. He reports that he does not drink alcohol or use drugs.    Family History   Problem Relation Age of Onset   ? Heart failure Mother    ? CABG Father 68   ? Sudden death Father 68   ? No Medical Problems Son        Outpatient Encounter Medications as of 10/2/2019   Medication Sig Dispense Refill   ? aspirin 81 MG EC tablet Take 81 mg by mouth daily.     ? FOLIC ACID/MULTIVITS-MIN/LUT (CENTRUM SILVER ORAL) Take 1 tablet by mouth daily.     ? lisinopril (PRINIVIL,ZESTRIL) 20 MG tablet Take 20 mg by mouth every evening.      ? metFORMIN (GLUCOPHAGE) 500 MG tablet Take 500 mg by mouth 2 (two) times a day with meals.     ? nitroglycerin (NITROSTAT) 0.4 MG SL tablet Place 0.4 mg under the tongue every 5 (five) minutes as needed for chest pain.     ? sertraline (ZOLOFT) 25 MG tablet Take 1 tablet (25 mg total) by mouth daily. 30 tablet 2   ? sildenafil (REVATIO) 20 mg tablet 25 mg daily.  11   ? [DISCONTINUED] atorvastatin (LIPITOR) 80 MG tablet Take 1 tablet (80 mg total) by mouth daily. 90 tablet 3   ? rosuvastatin (CRESTOR) 40 MG tablet Take 1 tablet (40 mg total) by mouth daily. 90 tablet 3     Facility-Administered Encounter Medications as of 10/2/2019   Medication Dose Route Frequency Provider Last Rate Last Dose   ? Study Drug Ticagrelor 60 mg/Placebo <THEMIS> tablet 60 mg  1 tablet Oral BID Rebeca Cheng MD       ? Study Drug Ticagrelor 60 mg/Placebo <THEMIS> tablet 60 mg  1 tablet Oral BID Rebeca Cheng MD       ? Study Drug Ticagrelor 60 mg/Placebo <THEMIS> tablet 60 mg  1 tablet Oral BID Rebeca Cheng MD       ? Study Drug Ticagrelor 60 mg/Placebo <THEMIS> tablet 60 mg  1 tablet Oral BID Rebeca Cheng MD       ? Study Drug Ticagrelor 60 mg/Placebo <THEMIS> tablet 60 mg  1 tablet Oral BID Rebeca Cheng MD       ? Study Drug Ticagrelor 60 mg/Placebo <THEMIS> tablet 60 mg  1 tablet Oral BID Rebeca Cheng MD      "      Review of Systems:     General: WNL  Eyes: WNL  Ears/Nose/Throat: WNL  Lungs: WNL  Heart: Arm Pain  Stomach: Diarrhea  Bladder: WNL  Muscle/Joints: Joint Pain, Muscle Pain  Skin: WNL  Nervous System: WNL  Mental Health: WNL     Blood: WNL    Objective:     /76 (Patient Site: Left Arm, Patient Position: Sitting, Cuff Size: Adult Regular)   Pulse 72   Resp 16   Ht 5' 11\" (1.803 m)   Wt 187 lb (84.8 kg)   BMI 26.08 kg/m    Wt Readings from Last 5 Encounters:   10/02/19 187 lb (84.8 kg)   07/31/19 185 lb (83.9 kg)   07/23/18 188 lb (85.3 kg)   05/17/18 189 lb 6.4 oz (85.9 kg)   05/03/18 193 lb (87.5 kg)        Physical Exam:    GENERAL APPEARANCE: alert, no apparent distress  EYES: no scleral icterus  NECK: no carotid bruits or adenopathy, jugular venous pressure is less than 5 cm  CHEST: symmetric, the lungs are clear to auscultation  CARDIOVASCULAR: regular rhythm without murmur or gallop  EXTREMITIES: no cyanosis, clubbing or edema  SKIN: no xanthelasma  NEUROLOGIC: normal gait and coordination  PSYCHIATRIC: mood and affect are normal    Cardiac Testing:    Results for orders placed during the hospital encounter of 06/16/15   NM Exercise Stress Test [[id number]] 06/16/2015    Narrative NUCLEAR STRESS STUDY    PRIMARY PHYSICIAN:  Miky Aguayo.    REQUESTING:  Jose Smith.      INDICATION:  Chest pain.    STRESS:  The patient exercised 6 minutes 33 seconds according to a Juan   protocol,  stopping secondary to chest pain.  Maximal heart rate achieved was 130   beats per  minute, which was 86% of predicted.  Blood pressure remained flat   beginning at  142/88 and ending at 140/82 yielding a double product 18,460.  The patient   did  report 1 out of 10 chest pain prior to exercise, which increased to 4/10   at end  exercise and resolved.  EKG was positive for ischemia per the reviewing  cardiologist, Dr. Scherer.    IMAGES:  Rest images were obtained following the administration of 3.99   mCi thallium  201. "  Stress imaging was obtained following exercise and the   administration of 32.2  mCi technetium 99m sestamibi.   is satisfactory.    FINDINGS:  These images demonstrate normal tracer uptake pattern in all   wall  segments.  Gated SPECT demonstrates an ejection fraction of 58%.  There is   abnormal  septal motion consistent with the patient's previous bypass surgery.    CONCLUSION:  1.  Juan protocol stress which was subjectively and objectively positive.  2.  No evidence of ischemia or infarction by nuclear imaging.  3.  Normal ejection fraction with abnormal septal motion consistent with   the  patient's previous bypass surgery.      WENDI LEON MD  la  D 06/16/2015 15:34:20  T 06/16/2015 16:08:34  R 06/16/2015 16:08:34  [id number]      cc: KEVIN HANCOCK MD        Results for orders placed in visit on 01/20/05   NM Pharmacologic Stress Test [[id number]] 01/20/2005    Narrative See Historical Hospital Medical Record for documentation       Imaging:    No results found.    Lab Results:    Lab Results   Component Value Date    CREATININE 0.81 05/16/2018    BUN 14 05/16/2018     05/16/2018    K 4.5 05/16/2018     (H) 05/16/2018    CO2 24 05/16/2018     Lab Results   Component Value Date    CHOL 146 07/31/2019    TRIG 97 07/31/2019    HDL 48 07/31/2019     Creatinine (mg/dL)   Date Value   05/16/2018 0.81   08/23/2017 0.85   10/18/2016 0.85   10/11/2016 0.87     Magnesium (mg/dL)   Date Value   09/11/2016 1.9   10/02/2013 2.3   10/01/2013 2.0     LDL Calculated (mg/dL)   Date Value   07/31/2019 79   05/16/2018 83   08/23/2017 66     Lab Results   Component Value Date    WBC 6.5 10/18/2016    WBC 7.0 09/04/2014    HGB 11.0 (L) 10/19/2016    HCT 41.3 10/18/2016    MCV 83 10/18/2016     10/18/2016           Much or all of the text in this note was generated through the use of the Dragon Dictate voice-to-text software. Errors in spelling or words which seem out of context  are unintentional. Sound alike errors, in particular, may have escaped editing.

## 2021-06-29 NOTE — PROGRESS NOTES
Progress Notes by Darrell Smith MD at 8/7/2020  9:10 AM     Author: Darrell Smith MD Service: -- Author Type: Physician    Filed: 8/7/2020  9:57 AM Encounter Date: 8/7/2020 Status: Signed    : Darrell Smith MD (Physician)             Assessment:    1. Coronary artery disease due to lipid rich plaque  Renal function panel   2. Dyslipidemia, goal LDL below 70  Lipid Profile   3. DM II (diabetes mellitus, type II), controlled (H)  Glycosylated Hemoglobin A1C       Plan:    1. Continue current cardiovascular medical therapy.  2. He asked that we draw laboratory today and request that we post the results to my chart with any advice.  3. Since he is moving to [place] full-time, follow-up with our clinic will be on an as-needed basis.  He was reminded that he can call our team at anytime should he have questions or concerns about his heart.    An After Visit Summary was printed and given to the patient.    Subjective:    Aguilar Patterson returned for a planned annual follow up visit.    Aguilar states that he and his wife have sold their home in [place] and are building a [facility] in [place], [place], which is a [facility] of [address] homes with [place] golf courses.    He states he has been very busy packing for the move and has not played golf since he returned from [place] this spring.  He does walk couple times a week and no longer has difficulty keeping up with his wife.  He speculates that she may be walking slower.    He describes chronic tightness in the left upper pectoral area ever since his coronary bypass surgery.  It has not changed.    Past Medical History:    Patient Active Problem List   Diagnosis   ? Dyslipidemia, goal LDL below 70   ? Essential hypertension   ? Male Erectile Disorder   ? Coronary artery disease due to lipid rich plaque   ? DM II (diabetes mellitus, type II), controlled (H)       Past Medical History:   Diagnosis Date   ? Basal cell carcinoma of skin 06/10/2016     left back, removed by primary excision   ? Bladder stone 5/12/2015   ? Calculus of gallbladder with chronic cholecystitis with obstruction    ? Carotid Artery Stenosis     Olivier, Miky: 50-69% by US    ? Colon adenoma 10/8/2015    COLONOSCOPY 10-1-15 DUE IN 5 YEARS    ? Coronary artery disease due to lipid rich plaque 2013   ? Dyslipidemia, goal LDL below 70 2013   ? Essential hypertension    ? History of transfusion    ? Kidney stones    ? Obstructive Sleep Apnea     He lost 50 pounds and does not use CPAP at this time (2016)   ? Prostate cancer (H)    ? Supraclinoid carotid artery aneurysm, small    ? Type 2 Diabetes Mellitus    ? Umbilical hernia        Past Surgical History:   Procedure Laterality Date   ? CORONARY ARTERY BYPASS GRAFT  9/302013    LIMA to LAD, and 3 SVG's to DG, OM and RCA   ? CYSTOSCOPY W/ LASER LITHOTRIPSY Right 5/11/2017    Procedure: CYSTOSCOPY RIGHT URETEROSCOPIC STONE EXTRACTION  WITH LASER LITHOTRIPSY,  RIGHT URETERAL STENT PLACEMENT;  Surgeon: Rohan Guillen MD;  Location: [facility];  Service:    ? LAPAROSCOPIC CHOLECYSTECTOMY N/A 9/11/2016    Procedure: CHOLECYSTECTOMY LAPAROSCOPIC WITH CHOLANGIOGRAMS;  Surgeon: Efraín Johnson MD;  Location: [facility] OR;  Service:    ? RI LAP,PROSTATECTOMY,RADICAL,W/NERVE SPARE,INCL ROBOTIC Bilateral 10/18/2016    Procedure: DAVINCI RADICAL RETROPUBIC PROSTATECTOMY BILATERAL PELVIC LYMPH NODE DISSECTION, REPAIR UMBILICAL HERNIA, REMOVE BLADDER CALCULUS LYSIS OF ADHESIONS;  Surgeon: Ramon Purcell MD;  Location: [facility];  Service: Urology        reports that he has never smoked. He has never used smokeless tobacco. He reports that he does not drink alcohol or use drugs.    Family History   Problem Relation Age of Onset   ? Heart failure Mother    ? CABG Father 68   ? Sudden death Father 68   ? No Medical Problems Son        Outpatient Encounter Medications as of 8/7/2020   Medication Sig Dispense Refill   ?  aspirin 81 MG EC tablet Take 81 mg by mouth daily.     ? FOLIC ACID/MULTIVITS-MIN/LUT (CENTRUM SILVER ORAL) Take 1 tablet by mouth daily.     ? lisinopril (PRINIVIL,ZESTRIL) 20 MG tablet Take 20 mg by mouth 2 (two) times a day. And morning     ? metFORMIN (GLUCOPHAGE) 500 MG tablet Take 500 mg by mouth 2 (two) times a day with meals.     ? nitroglycerin (NITROSTAT) 0.4 MG SL tablet Place 0.4 mg under the tongue every 5 (five) minutes as needed for chest pain.     ? rosuvastatin (CRESTOR) 40 MG tablet Take 1 tablet (40 mg total) by mouth daily. 90 tablet 0   ? sertraline (ZOLOFT) 25 MG tablet Take 1 tablet (25 mg total) by mouth daily. 30 tablet 2   ? [DISCONTINUED] oxybutynin (DITROPAN) 5 MG tablet Take 1 tablet (5 mg total) by mouth 3 (three) times a day as needed (bladder spasm). 30 tablet 0   ? [DISCONTINUED] oxyCODONE (ROXICODONE) 5 MG immediate release tablet Take 1 tablet (5 mg total) by mouth every 6 (six) hours as needed for pain. 12 tablet 0   ? [DISCONTINUED] phenazopyridine (PYRIDIUM) 100 MG tablet Take 1 tablet (100 mg total) by mouth 3 (three) times a day as needed. 9 tablet 0   ? [DISCONTINUED] sildenafil (REVATIO) 20 mg tablet 25 mg daily as needed.   11     Facility-Administered Encounter Medications as of 8/7/2020   Medication Dose Route Frequency Provider Last Rate Last Dose   ? Study Drug Ticagrelor 60 mg/Placebo <THEMIS> tablet 60 mg  1 tablet Oral BID Rebeca Cheng MD       ? Study Drug Ticagrelor 60 mg/Placebo <THEMIS> tablet 60 mg  1 tablet Oral BID Rebeca Cheng MD       ? Study Drug Ticagrelor 60 mg/Placebo <THEMIS> tablet 60 mg  1 tablet Oral BID Rebeca Cheng MD       ? Study Drug Ticagrelor 60 mg/Placebo <THEMIS> tablet 60 mg  1 tablet Oral BID Rebeca Cheng MD       ? Study Drug Ticagrelor 60 mg/Placebo <THEMIS> tablet 60 mg  1 tablet Oral BID Rebeca Cheng MD       ? Study Drug Ticagrelor 60 mg/Placebo <THEMIS> tablet 60 mg  1 tablet Oral BID Rebeca Cheng,  "MD           Review of Systems:     General: WNL  Eyes: WNL  Ears/Nose/Throat: WNL  Lungs: WNL  Heart: WNL  Stomach: WNL  Bladder: WNL  Muscle/Joints: Muscle Weakness, Muscle Pain  Skin: WNL  Nervous System: Daytime Sleepiness  Mental Health: WNL     Blood: Easy Bruising    Objective:     /82 (Patient Site: Left Arm, Patient Position: Sitting, Cuff Size: Adult Regular)   Pulse 75   Resp 16   Ht 5' 11\" (1.803 m)   Wt 189 lb (85.7 kg) Comment: With shoes.  SpO2 94%   BMI 26.36 kg/m    Wt Readings from Last 5 Encounters:   08/07/20 189 lb (85.7 kg)   01/10/20 187 lb (84.8 kg)   01/09/20 187 lb 8 oz (85 kg)   10/02/19 187 lb (84.8 kg)   07/31/19 185 lb (83.9 kg)        Physical Exam:    GENERAL APPEARANCE: alert, no apparent distress  EYES: no scleral icterus  NECK: no carotid bruits or adenopathy, jugular venous pressure is less than 5 cm  CHEST: symmetric, the lungs are clear to auscultation  CARDIOVASCULAR: regular rhythm without murmur or gallop  EXTREMITIES: no cyanosis, clubbing or edema  SKIN: no xanthelasma  NEUROLOGIC: normal gait and coordination  PSYCHIATRIC: mood and affect are normal    Cardiac Testing:    Results for orders placed during the hospital encounter of 06/16/15   NM Exercise Stress Test [[id number]] 06/16/2015    Narrative NUCLEAR STRESS STUDY    PRIMARY PHYSICIAN:  Miky Aguayo.    REQUESTING:  Jose Smith.      INDICATION:  Chest pain.    STRESS:  The patient exercised 6 minutes 33 seconds according to a Juan   protocol,  stopping secondary to chest pain.  Maximal heart rate achieved was 130   beats per  minute, which was 86% of predicted.  Blood pressure remained flat   beginning at  142/88 and ending at 140/82 yielding a double product 18,460.  The patient   did  report 1 out of 10 chest pain prior to exercise, which increased to 4/10   at end  exercise and resolved.  EKG was positive for ischemia per the reviewing  cardiologist, Dr. Scherer.    IMAGES:  Rest images were obtained " following the administration of 3.99   mCi thallium  201.  Stress imaging was obtained following exercise and the   administration of 32.2  mCi technetium 99m sestamibi.   is satisfactory.    FINDINGS:  These images demonstrate normal tracer uptake pattern in all   wall  segments.  Gated SPECT demonstrates an ejection fraction of 58%.  There is   abnormal  septal motion consistent with the patient's previous bypass surgery.    CONCLUSION:  1.  Juan protocol stress which was subjectively and objectively positive.  2.  No evidence of ischemia or infarction by nuclear imaging.  3.  Normal ejection fraction with abnormal septal motion consistent with   the  patient's previous bypass surgery.      WENID LEON MD  la  D 06/16/2015 15:34:20  T 06/16/2015 16:08:34  R 06/16/2015 16:08:34  [id number]      cc: KEVIN HANCOCK MD        Results for orders placed in visit on 01/20/05   NM Pharmacologic Stress Test [[id number]] 01/20/2005    Narrative See Historical Hospital Medical Record for documentation       Imaging:    No results found.    Lab Results:    Lab Results   Component Value Date    CREATININE 1.11 01/09/2020    BUN 17 01/09/2020     01/09/2020    K 4.1 01/09/2020     01/09/2020    CO2 26 01/09/2020     Lab Results   Component Value Date    CHOL 125 01/09/2020    TRIG 166 (H) 01/09/2020    HDL 44 01/09/2020     Creatinine (mg/dL)   Date Value   01/09/2020 1.11   05/16/2018 0.81   08/23/2017 0.85   10/18/2016 0.85     Magnesium (mg/dL)   Date Value   09/11/2016 1.9   10/02/2013 2.3   10/01/2013 2.0     LDL Calculated (mg/dL)   Date Value   01/09/2020 48   07/31/2019 79   05/16/2018 83     Lab Results   Component Value Date    WBC 6.6 01/09/2020    WBC 7.0 09/04/2014    HGB 13.3 (L) 01/09/2020    HCT 39.0 (L) 01/09/2020    MCV 85 01/09/2020     01/09/2020           Much or all of the text in this note was generated through the use of the Dragon Dictate voice-to-text  software. Errors in spelling or words which seem out of context are unintentional. Sound alike errors, in particular, may have escaped editing.

## 2021-07-03 NOTE — ADDENDUM NOTE
Addendum Note by Kevin Smith MD at 10/2/2019  1:30 PM     Author: Kevin Smith MD Service: -- Author Type: Physician    Filed: 10/2/2019  2:16 PM Encounter Date: 10/2/2019 Status: Signed    : Kevin Smith MD (Physician)    Addended by: KEVIN SMITH on: 10/2/2019 02:16 PM        Modules accepted: Orders

## 2021-09-29 ENCOUNTER — OFFICE VISIT (OUTPATIENT)
Dept: URBAN - METROPOLITAN AREA CLINIC 56 | Facility: CLINIC | Age: 76
End: 2021-09-29
Payer: COMMERCIAL

## 2021-09-29 DIAGNOSIS — E11.9 TYPE 2 DIABETES MELLITUS W/O COMPLICATION: Primary | ICD-10-CM

## 2021-09-29 DIAGNOSIS — Z79.84 LONG TERM (CURRENT) USE OF ORAL ANTIDIABETIC DRUGS: ICD-10-CM

## 2021-09-29 DIAGNOSIS — H52.223 REGULAR ASTIGMATISM, BILATERAL: ICD-10-CM

## 2021-09-29 DIAGNOSIS — H25.12 AGE-RELATED NUCLEAR CATARACT, LEFT EYE: ICD-10-CM

## 2021-09-29 DIAGNOSIS — H52.31 ANISOMETROPIA: ICD-10-CM

## 2021-09-29 DIAGNOSIS — H25.811 COMBINED FORMS OF AGE-RELATED CATARACT, RIGHT EYE: ICD-10-CM

## 2021-09-29 DIAGNOSIS — H50.40 HETEROTROPIA: ICD-10-CM

## 2021-09-29 PROCEDURE — 92134 CPTRZ OPH DX IMG PST SGM RTA: CPT | Performed by: OPTOMETRIST

## 2021-09-29 PROCEDURE — 92250 FUNDUS PHOTOGRAPHY W/I&R: CPT | Performed by: OPTOMETRIST

## 2021-09-29 PROCEDURE — 92004 COMPRE OPH EXAM NEW PT 1/>: CPT | Performed by: OPTOMETRIST

## 2021-09-29 ASSESSMENT — VISUAL ACUITY
OS: 20/20
OD: 20/20

## 2021-09-29 ASSESSMENT — KERATOMETRY
OD: 44.55
OS: 44.38

## 2021-09-29 NOTE — IMPRESSION/PLAN
Impression: Heterotropia: H50.40. 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 Patient underwent strabismus surgery in his youth but does not have binocular vision. Miguel Bernal

## 2021-09-29 NOTE — IMPRESSION/PLAN
Impression: Anisometropia: H52.31. Plan: Recommended glasses for optimal vision. New glasses prescription given to patient today.

## 2021-09-29 NOTE — IMPRESSION/PLAN
Impression: Type 2 diabetes mellitus w/o complication: Z80.3. Plan: Discussed with the patient my findings. No diabetic retinopathy on today's exam.  Patient encourage to monitor blood glucose and advised to call office if notes any sudden changes in vision. Patient informed the importance for regular diabetic exams.

## 2021-09-29 NOTE — IMPRESSION/PLAN
Impression: Combined forms of age-related cataract, right eye: H25.811. Plan: Discussed cataract surgery option with patient today; however, declined a surgical consultation at this time. We will continue to monitor. Patient to return in 1 year for CEE with refraction and  glare testing if indicated or sooner if patient experiences a significant decline in acuity. Not visually significant. Observe until vision decreases.  Will re-evaluate at the next annual comprehensive eye exam.

## 2021-10-11 ENCOUNTER — HEALTH MAINTENANCE LETTER (OUTPATIENT)
Age: 76
End: 2021-10-11

## 2022-01-30 ENCOUNTER — HEALTH MAINTENANCE LETTER (OUTPATIENT)
Age: 77
End: 2022-01-30

## 2022-06-01 ENCOUNTER — OFFICE VISIT (OUTPATIENT)
Dept: URBAN - METROPOLITAN AREA CLINIC 56 | Facility: CLINIC | Age: 77
End: 2022-06-01
Payer: COMMERCIAL

## 2022-06-01 DIAGNOSIS — H25.813 COMBINED FORMS OF AGE-RELATED CATARACT, BILATERAL: ICD-10-CM

## 2022-06-01 DIAGNOSIS — Z79.84 LONG TERM (CURRENT) USE OF ORAL ANTIDIABETIC DRUGS: ICD-10-CM

## 2022-06-01 DIAGNOSIS — E11.9 TYPE 2 DIABETES MELLITUS WITHOUT COMPLICATIONS: Primary | ICD-10-CM

## 2022-06-01 DIAGNOSIS — H50.011 MONOCULAR ESOTROPIA, RIGHT EYE: ICD-10-CM

## 2022-06-01 DIAGNOSIS — H52.31 ANISOMETROPIA: ICD-10-CM

## 2022-06-01 DIAGNOSIS — H53.8 OTHER VISUAL DISTURBANCES: ICD-10-CM

## 2022-06-01 DIAGNOSIS — H52.223 REGULAR ASTIGMATISM, BILATERAL: ICD-10-CM

## 2022-06-01 PROCEDURE — 92134 CPTRZ OPH DX IMG PST SGM RTA: CPT | Performed by: OPTOMETRIST

## 2022-06-01 PROCEDURE — 99214 OFFICE O/P EST MOD 30 MIN: CPT | Performed by: OPTOMETRIST

## 2022-06-01 PROCEDURE — 92250 FUNDUS PHOTOGRAPHY W/I&R: CPT | Performed by: OPTOMETRIST

## 2022-06-01 ASSESSMENT — INTRAOCULAR PRESSURE
OD: 12
OS: 12

## 2022-06-01 ASSESSMENT — KERATOMETRY
OS: 44.62
OD: 44.67

## 2022-06-01 ASSESSMENT — VISUAL ACUITY
OS: 20/30
OD: 20/30

## 2022-06-01 NOTE — IMPRESSION/PLAN
Impression: Regular astigmatism, bilateral: H52.223. Plan: Recommend glasses for best vision. If still experiencing vision flucuation through out the day, recommend patient follow up with PCP.

## 2022-06-01 NOTE — IMPRESSION/PLAN
Impression: Type 2 diabetes mellitus without complications: I98.7. Plan: No diabetic retinopathy. Recommend yearly diabetic eye exam. Discussed with patient importance of good blood sugar control.

## 2022-06-01 NOTE — IMPRESSION/PLAN
Impression: Other visual disturbances: H53.8. Plan: Patient notes vision changes throughout the day. Last A1C was 5.7. If no improvement with new glasses recommend follow up with PCP. Can also see Dr Galicia  for second opinion.

## 2022-06-01 NOTE — IMPRESSION/PLAN
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 Plan: Surgery as child. Minimal binocular vision.

## 2022-07-17 ENCOUNTER — HEALTH MAINTENANCE LETTER (OUTPATIENT)
Age: 77
End: 2022-07-17

## 2022-09-25 ENCOUNTER — HEALTH MAINTENANCE LETTER (OUTPATIENT)
Age: 77
End: 2022-09-25

## 2023-03-10 NOTE — PROGRESS NOTES
Dr. Smith read EKG as unchanged and you are now cleared for your surgery. please call results to the patient or send a letter if not reachable by phone. Yes

## 2023-05-13 ENCOUNTER — HEALTH MAINTENANCE LETTER (OUTPATIENT)
Age: 78
End: 2023-05-13

## 2023-08-05 ENCOUNTER — HEALTH MAINTENANCE LETTER (OUTPATIENT)
Age: 78
End: 2023-08-05

## 2023-12-23 ENCOUNTER — HEALTH MAINTENANCE LETTER (OUTPATIENT)
Age: 78
End: 2023-12-23